# Patient Record
Sex: MALE | Race: OTHER | Employment: OTHER | URBAN - METROPOLITAN AREA
[De-identification: names, ages, dates, MRNs, and addresses within clinical notes are randomized per-mention and may not be internally consistent; named-entity substitution may affect disease eponyms.]

---

## 2017-05-25 ENCOUNTER — OFFICE VISIT (OUTPATIENT)
Dept: FAMILY MEDICINE CLINIC | Age: 47
End: 2017-05-25

## 2017-05-25 VITALS
HEIGHT: 65 IN | DIASTOLIC BLOOD PRESSURE: 89 MMHG | WEIGHT: 169 LBS | TEMPERATURE: 97.7 F | SYSTOLIC BLOOD PRESSURE: 130 MMHG | BODY MASS INDEX: 28.16 KG/M2 | HEART RATE: 65 BPM

## 2017-05-25 DIAGNOSIS — S39.012A LUMBAR STRAIN, INITIAL ENCOUNTER: Primary | ICD-10-CM

## 2017-05-25 DIAGNOSIS — R09.81 NASAL CONGESTION: ICD-10-CM

## 2017-05-25 DIAGNOSIS — R42 VERTIGO: ICD-10-CM

## 2017-05-25 RX ORDER — LORATADINE 10 MG/1
10 TABLET ORAL DAILY
Qty: 30 TAB | Refills: 2 | Status: SHIPPED | OUTPATIENT
Start: 2017-05-25 | End: 2022-01-24 | Stop reason: ALTCHOICE

## 2017-05-25 RX ORDER — TRAMADOL HYDROCHLORIDE 50 MG/1
50 TABLET ORAL
Qty: 15 TAB | Refills: 0 | Status: SHIPPED | OUTPATIENT
Start: 2017-05-25 | End: 2018-03-25

## 2017-05-25 RX ORDER — METHOCARBAMOL 500 MG/1
500 TABLET, FILM COATED ORAL 3 TIMES DAILY
Qty: 12 TAB | Refills: 0 | Status: SHIPPED | OUTPATIENT
Start: 2017-05-25 | End: 2018-03-25

## 2017-05-25 RX ORDER — NAPROXEN 500 MG/1
500 TABLET ORAL 2 TIMES DAILY WITH MEALS
Qty: 60 TAB | Refills: 0 | Status: SHIPPED | OUTPATIENT
Start: 2017-05-25 | End: 2018-03-25

## 2017-05-25 NOTE — PATIENT INSTRUCTIONS
Mareos: Instrucciones de cuidado - [ Dizziness: Care Instructions ]  Instrucciones de cuidado  Los mareos son Kenyatta Craft sensación de inestabilidad o confusión en la tushar. Son distintos al vértigo, airam sensación de que la habitación gira o de que usted se mueve o . También es distinto del aturdimiento, que es la sensación de que está a punto de desmayarse. Puede resultar difícil conocer la causa de los [place]. Algunas personas se sienten mareadas cuando tienen migrañas. A veces, los episodios gripales pueden hacer que se sienta mareado. Algunas afecciones médicas, sarah los problemas cardíacos o la presión arterial leticia, pueden hacer que se sienta mareado. Muchos medicamentos pueden causar mareos, sarah los que se usan para la presión arterial leticia, el dolor o la ansiedad. Si es un medicamento el que está causando los síntomas, ann médico podría recomendarle que lo cambie o deje de tomarlo. Si es un problema cardíaco, podría necesitar medicamentos para ayudar a que ann corazón funcione mejor. Si no hay razón aparente para los síntomas, ann médico podría sugerir vigilar y esperar jose un tiempo para james si los mareos desaparecen por sí solos. La atención de seguimiento es airam parte clave de ann tratamiento y seguridad. Asegúrese de hacer y acudir a todas las citas, y llame a ann médico si está teniendo problemas. También es airam buena idea saber los resultados de ezra exámenes y mantener airam lista de los medicamentos que rajiv. ¿Cómo puede cuidarse en el hogar? · Si ann médico le recomienda o receta medicamentos, tómelos exactamente según las indicaciones. Llame a ann médico si tutu estar teniendo un problema con ann medicamento. · No conduzca mientras se sienta mareado. · Trate de prevenir las caídas. Algunas medidas que puede franc son:  Lorrane Hidden Usar tapetes antideslizantes, agregar agarraderas cerca de la vargas y usar luces nocturnas.   ¨ Ordenar ann casa de kenna manera que en los senderos no haya nada con lo que se pueda tropezar. ¨ Avisarles a familiares y [facility] que se ha estado sintiendo [place] les servirá para saber cómo ayudarle. ¿Cuándo debe pedir ayuda? Llame al 911 en cualquier momento que considere que necesita atención de [place]. Por ejemplo, llame si:  · Se desmayó (perdió el conocimiento). · Tiene mareos junto con síntomas de un ataque cardíaco. Estos pueden incluir:  ¨ Dolor de pecho, o presión o airam sensación extraña en el pecho. ¨ Sudoración. ¨ Falta de aire. ¨ Náuseas o vómito. ¨ Dolor, presión, o airam sensación extraña en la espalda, el jorge, la mandíbula o el abdomen superior, o en gume o ambos hombros o brazos. ¨ Aturdimiento o debilidad repentina. ¨ Un latido cardíaco rápido o irregular. · Tiene síntomas de un ataque cerebral. Estos pueden incluir:  ¨ Entumecimiento, hormigueo, debilidad o parálisis repentinos en la kvng, el brazo o la pierna, sobre todo si ocurre en un solo lado del cuerpo. ¨ Cambios súbitos en la vista. ¨ Problemas repentinos para hablar. ¨ Confusión súbita o dificultad repentina para comprender frases sencillas. ¨ Problemas repentinos para caminar o mantener el equilibrio. ¨ Un dolor de tushar intenso y repentino, distinto a los wilman de tushar anteriores. Llame a ann médico ahora mismo o busque atención médica inmediata si:  · Se siente mareado y [place] [facility], dolor rosa m Balderrama o [facility] oídos. · Tiene nuevas náuseas y vómito o [address] · Mounika mareos no desaparecen o regresan. Preste especial atención a los cambios en ann amari y asegúrese de comunicarse con ann médico si:  · No mejora sarah se esperaba. ¿Dónde puede encontrar más información en inglés? Elisabeth Rainey a [email or web address] Donovan Morelos [id number] en la búsqueda para aprender más acerca de \"Mareos: Instrucciones de cuidado - [ Dizziness: Care Instructions ]. \"  Revisado: 27 [place], 2016  Versión del contenido: 11.2  © 7412-3949 Healthwise, Incorporated.  [address] fueron adaptadas bajo licencia por [employer] (which disclaims liability or warranty for this information). Si usted tiene [place] afección médica o sobre estas instrucciones, siempre pregunte a ann profesional de amari. [employer] niega toda garantía o responsabilidad por ann uso de esta información. Distensión de la espalda: Instrucciones de cuidado - [ Back Strain: Care Instructions ]  Instrucciones de cuidado    La distensión de la espalda ocurre cuando usted estira demasiado, o fuerza, un músculo de la espalda. Usted puede lesionarse la espalda en un accidente o cuando hace ejercicio o levanta algo. La mayoría de los wilman de espalda mejoran con reposo y [place]. Usted puede cuidarse en el hogar para ayudar a que ann espalda sane. La atención de seguimiento es airam parte clave de ann tratamiento y seguridad. Asegúrese de hacer y acudir a todas las citas, y llame a ann médico si está teniendo problemas. También es airam buena idea saber los resultados de los exámenes y mantener airam lista de los medicamentos que rajiv. ¿Cómo puede cuidarse en el hogar? · Trate de permanecer tan activo sarah pueda, renetta deje de hacer o reduzca cualquier actividad que le produzca dolor. · Colóquese hielo o airam compresa fría en el músculo adolorido de 10 a 20 minutos cada vez para detener la hinchazón. Trate de hacerlo cada 1 o 2 horas jose 3 días (cuando esté despierto) o hasta que la hinchazón baje. Póngase un paño pham entre el hielo y la piel. · Después de 2 o 3 días, coloque airam almohadilla térmica ajustada a baja temperatura o un paño tibio sobre la espalda. Algunos médicos sugieren que se alterne entre tratamientos calientes y fríos. · [employer] analgésicos (medicamentos para el dolor) exactamente según las indicaciones. ¨ Si el médico le recetó un analgésico, tómelo según las indicaciones.   ¨ Si no está tomando un analgésico recetado, pregúntele a ann médico si puede franc gume de [employer]. · Trate de dormir de lado con airam almohada entre las piernas. O, colóquese airam almohada debajo de las rodillas cuando se acueste [place] Estas medidas pueden aliviar el dolor en la parte baja de la espalda. · Meron Edman a poco a ann nivel habitual de actividades. ¿Cuándo debe pedir ayuda? Llame al 911 en cualquier momento que considere que necesita atención de [place]. Por ejemplo, llame si:  · Es completamente incapaz de  airam pierna. Llame a ann médico ahora mismo o busque atención médica inmediata si:  · Tiene síntomas nuevos o peores en las piernas, el abdomen o las nalgas (glúteos). Los síntomas pueden incluir:  ¨ Entumecimiento u hormigueo. ¨ Debilidad. ¨ Dolor. · Pierde el control de la vejiga o del intestino. Preste especial atención a los cambios en ann amari y asegúrese de comunicarse con ann médico si no mejora sarah se esperaba. ¿Dónde puede encontrar más información en inglés? Laila Cadenars a [email or web address] Yin Preston D903 en la búsqueda para aprender más acerca de \"Distensión de la espalda: Instrucciones de cuidado - [ Back Strain: Care Instructions ]. \"  Revisado: 23 [place], 2016  Versión del contenido: 11.2  © 6337-2805 Healthwise, Incorporated. Las instrucciones de cuidado fueron adaptadas bajo licencia por [employer] (which disclaims liability or warranty for this information). Si usted tiene [place] afección médica o sobre estas instrucciones, siempre pregunte a ann profesional de amari. [place], [facility] niega toda garantía o responsabilidad por ann uso de esta información. Estiramientos de la espalda: Ejercicios - [ Back Stretches: Exercises ]  Instrucciones de cuidado  Aquí se presentan algunos ejemplos de ejercicios para estiramiento de la espalda. Empiece cada ejercicio lentamente. Reduzca la intensidad del ejercicio si Gerardine Campanile a tener dolor.   Ann médico o fisioterapeuta le dirán cuándo puede comenzar con [place] ejercicios y cuáles funcionarán mejor para usted. Cómo hacer los ejercicios   Estiramiento sobre la tushar    1. Párese cómodamente y separe los pies a la distancia de los hombros.  2. T[place] adelante, levante ambos brazos sobre ann Lea Lesches y trate de alcanzar el techo. No deje que la tushar se incline [place] atrás. 3. Mantenga la posición jose 15 a 30 segundos y [employer] a los lados. 4. Repita de 2 a 4 veces. Estiramiento lateral    1. Párese cómodamente y separe los pies a la distancia de los hombros.  2. Jerilynn Mates un brazo sobre la tushar y luego inclínese hacia el otro lado. 3. Deslice ann mano por la pierna mientras tiffani que el peso de ann brazo estire suavemente los músculos laterales. Mantenga la posición entre 15 y 27 segundos. 4. Repita de 2 a 4 veces de cada lado. [place]    1. Acuéstese boca abajo, apoyando ann cuerpo con los antebrazos. 2. Beau presión con los codos en el piso para elevar la espalda. Al hacer esto, relaje los músculos del estómago y permita que ann espalda se arquee sin usar los músculos de ann espalda. Al hacer presión, evite que ezra caderas o la pelvis se separen del piso. 3. Mantenga la posición jose 15 a 30 segundos y luego relájese. 4. Repita de 2 a 4 veces. Relajamiento y descanso    1. Acuéstese boca arriba con airam toalla enrollada debajo de ann jorge y [place] almohada debajo de las rodillas. Extienda los brazos cómodamente a los lados. 2. Relájese y respire con normalidad. 3. Permanezca en esta posición jose unos 10 minutos. 4. Si quiere, puede hacer [employer] 2 y 3 veces al día. La atención de seguimiento es airam parte clave de ann tratamiento y seguridad. Asegúrese de hacer y acudir a todas las citas, y llame a ann médico si está teniendo problemas. También es airam buena idea saber los resultados de los exámenes y mantener airam lista de los medicamentos que rajiv. ¿Dónde puede encontrar más información en inglés?   Lilia Moulds a [email or web address] Gregg Elder B073 en la búsqueda para aprender más acerca de \"Estiramientos de la espalda: Ejercicios - [ Back Stretches: Exercises ]. \"  Revisado: 23 [place], 2016  Versión del contenido: 11.2  © 7105-5198 Healthwise, Incorporated. Las instrucciones de cuidado fueron adaptadas bajo licencia por [employer] (which disclaims liability or warranty for this information). Si usted tiene [place] afección médica o sobre estas instrucciones, siempre pregunte a ann profesional de amari. [facility], [employer] niega toda garantía o responsabilidad por ann uso de esta información.

## 2017-05-25 NOTE — PROGRESS NOTES
Assessment/Plan:    Brenden was seen today for back pain and dizziness. Diagnoses and all orders for this visit:    Lumbar strain, initial encounter  -     methocarbamol (ROBAXIN) 500 mg tablet; Take 1 Tab by mouth three (3) times daily. [place] 1 pastilla 3 veces al linda por ann dolor  -     naproxen (NAPROSYN) 500 mg tablet; Take 1 Tab by mouth two (2) times daily (with meals). [place] 1 [employer] veces al linda con comida por ann dolor  -     traMADol (ULTRAM) 50 mg tablet; Take 1 Tab by mouth every six (6) hours as needed for Pain. Max Daily Amount: 200 mg. [place] 1 pastilla cada 6 horas por so dolor thanh    Nasal congestion  -     loratadine (CLARITIN) 10 mg tablet; Take 1 Tab by mouth daily. [place] 1 pastilla cada linda por ann mareos y ann nares    Vertigo  -     loratadine (CLARITIN) 10 mg tablet; Take 1 Tab by mouth daily. [place] 1 pastilla cada linda por ann mareos y ann nares        Follow-up Disposition:  Return if symptoms worsen or fail to improve. Douglas Ortiz PA-C  Brenden [facility] expressed understanding of this plan. An AVS was printed and given to the patient.      ----------------------------------------------------------------------    Chief Complaint   Patient presents with    Back Pain     in the lower back    Dizziness       History of Present Illness:  2 days of lumbar back pain. No known injury but his job is to move concrete blocks around. He has no radiation of the pain and he has not had any numbness to his extremities, no bowel or bladder changes either. Yesterday, he had a few episodes of feeling lightheaded. He also has had nasal congestion. No cough        No past medical history on file. Current Outpatient Prescriptions   Medication Sig Dispense Refill    methocarbamol (ROBAXIN) 500 mg tablet Take 1 Tab by mouth three (3) times daily.  [place] 1 pastilla 3 veces al linda por ann dolor 12 Tab 0    naproxen (NAPROSYN) 500 mg tablet Take 1 Tab by mouth two (2) times daily (with meals). [place] 1 [facility] [employer] veces al linda con comida por ann dolor 60 Tab 0    traMADol (ULTRAM) 50 mg tablet Take 1 Tab by mouth every six (6) hours as needed for Pain. Max Daily Amount: 200 mg. [place] 1 pastilla cada 6 horas por so dolor thanh 15 Tab 0    loratadine (CLARITIN) 10 mg tablet Take 1 Tab by mouth daily. [place] 1 pastilla cada linda por ann mareos y ann nares 30 Tab 2       No Known Allergies    Social History   Substance Use Topics    Smoking status: Former Smoker     Quit date: 4/26/2016    Smokeless tobacco: Never Used    Alcohol use No      Comment: quit 15 days ago       No family history on file.     Physical Exam:     Visit Vitals    /89 (BP 1 Location: Right arm, BP Patient Position: Sitting)    Pulse 65    Temp 97.7 °F (36.5 °C) (Oral)    Ht 5' 4.69\" (1.643 m)    Wt 169 lb (76.7 kg)    BMI 28.4 kg/m2     gen looks well, walking with rigid gait  A&Ox3  WDWN NAD  Respirations normal and non labored  Toe touch limited due to pain  Right SLR reproduces pain at 45deg  Toe walk and heel walk reproduce pain  At lumbar spine- he has spasms of his paraspinal m  HEENT- neg for acute findings  Neuro CN 2-12 grossly intact

## 2017-05-25 NOTE — PROGRESS NOTES
Avs discussed with Thony Ortiz by Discharge Nurse Gerald Hernandez LPN, with  Sondra Lozano. Discussed medications prescribed today. Paper rx for Tramadol given to pt. Went over exercises for the back. Pt advised to return of he is not getting any better. Pt verbalized understanding and had no further questions.  AVS printed and given to patient Gerald Hernandez LPN

## 2017-05-25 NOTE — PROGRESS NOTES
Coordination of Care  1. Have you been to the ER, urgent care clinic since your last visit? Hospitalized since your last visit? No    2. Have you seen or consulted any other health care providers outside of the [address] [place] [address] since your last visit? Include any pap smears or colon screening. No    Medications  Medication Reconciliation Performed: no  Patient does not know need refills     Learning Assessment Complete?  yes

## 2018-03-25 ENCOUNTER — HOSPITAL ENCOUNTER (EMERGENCY)
Age: 48
Discharge: HOME | End: 2018-03-25
Attending: EMERGENCY MEDICINE
Payer: SUBSIDIZED

## 2018-03-25 ENCOUNTER — APPOINTMENT (OUTPATIENT)
Dept: CT IMAGING | Age: 48
End: 2018-03-25
Attending: EMERGENCY MEDICINE
Payer: SUBSIDIZED

## 2018-03-25 ENCOUNTER — APPOINTMENT (OUTPATIENT)
Dept: GENERAL RADIOLOGY | Age: 48
End: 2018-03-25
Attending: EMERGENCY MEDICINE
Payer: SUBSIDIZED

## 2018-03-25 VITALS
HEIGHT: 64 IN | OXYGEN SATURATION: 98 % | RESPIRATION RATE: 19 BRPM | WEIGHT: 169 LBS | TEMPERATURE: 97.6 F | HEART RATE: 84 BPM | SYSTOLIC BLOOD PRESSURE: 127 MMHG | DIASTOLIC BLOOD PRESSURE: 78 MMHG | BODY MASS INDEX: 28.85 KG/M2

## 2018-03-25 DIAGNOSIS — A08.4 VIRAL GASTROENTERITIS: Primary | ICD-10-CM

## 2018-03-25 DIAGNOSIS — R10.12 ABDOMINAL PAIN, LUQ (LEFT UPPER QUADRANT): ICD-10-CM

## 2018-03-25 LAB
ALBUMIN SERPL-MCNC: 3.7 G/DL (ref 3.5–5)
ALBUMIN/GLOB SERPL: 0.8 {RATIO} (ref 1.1–2.2)
ALP SERPL-CCNC: 118 U/L (ref 45–117)
ALT SERPL-CCNC: 71 U/L (ref 12–78)
ANION GAP SERPL CALC-SCNC: 11 MMOL/L (ref 5–15)
AST SERPL-CCNC: 96 U/L (ref 15–37)
BASOPHILS # BLD: 0 K/UL (ref 0–0.1)
BASOPHILS NFR BLD: 0 % (ref 0–1)
BILIRUB SERPL-MCNC: 0.4 MG/DL (ref 0.2–1)
BUN SERPL-MCNC: 21 MG/DL (ref 6–20)
BUN/CREAT SERPL: 25 (ref 12–20)
CALCIUM SERPL-MCNC: 8.3 MG/DL (ref 8.5–10.1)
CHLORIDE SERPL-SCNC: 104 MMOL/L (ref 97–108)
CO2 SERPL-SCNC: 24 MMOL/L (ref 21–32)
CREAT SERPL-MCNC: 0.85 MG/DL (ref 0.7–1.3)
DIFFERENTIAL METHOD BLD: ABNORMAL
EOSINOPHIL # BLD: 0.1 K/UL (ref 0–0.4)
EOSINOPHIL NFR BLD: 2 % (ref 0–7)
ERYTHROCYTE [DISTWIDTH] IN BLOOD BY AUTOMATED COUNT: 12.7 % (ref 11.5–14.5)
GLOBULIN SER CALC-MCNC: 4.8 G/DL (ref 2–4)
GLUCOSE SERPL-MCNC: 100 MG/DL (ref 65–100)
HCT VFR BLD AUTO: 41.9 % (ref 36.6–50.3)
HGB BLD-MCNC: 13.9 G/DL (ref 12.1–17)
IMM GRANULOCYTES # BLD: 0 K/UL (ref 0–0.04)
IMM GRANULOCYTES NFR BLD AUTO: 0 % (ref 0–0.5)
LYMPHOCYTES # BLD: 1.4 K/UL (ref 0.8–3.5)
LYMPHOCYTES NFR BLD: 36 % (ref 12–49)
MCH RBC QN AUTO: 29.8 PG (ref 26–34)
MCHC RBC AUTO-ENTMCNC: 33.2 G/DL (ref 30–36.5)
MCV RBC AUTO: 89.7 FL (ref 80–99)
MONOCYTES # BLD: 0.5 K/UL (ref 0–1)
MONOCYTES NFR BLD: 13 % (ref 5–13)
NEUTS SEG # BLD: 2 K/UL (ref 1.8–8)
NEUTS SEG NFR BLD: 49 % (ref 32–75)
NRBC # BLD: 0 K/UL (ref 0–0.01)
NRBC BLD-RTO: 0 PER 100 WBC
PLATELET # BLD AUTO: 287 K/UL (ref 150–400)
PMV BLD AUTO: 9.4 FL (ref 8.9–12.9)
POTASSIUM SERPL-SCNC: 4.4 MMOL/L (ref 3.5–5.1)
PROT SERPL-MCNC: 8.5 G/DL (ref 6.4–8.2)
RBC # BLD AUTO: 4.67 M/UL (ref 4.1–5.7)
SODIUM SERPL-SCNC: 139 MMOL/L (ref 136–145)
WBC # BLD AUTO: 4 K/UL (ref 4.1–11.1)

## 2018-03-25 PROCEDURE — 99284 EMERGENCY DEPT VISIT MOD MDM: CPT

## 2018-03-25 PROCEDURE — 87045 FECES CULTURE AEROBIC BACT: CPT | Performed by: EMERGENCY MEDICINE

## 2018-03-25 PROCEDURE — 74011250636 HC RX REV CODE- 250/636: Performed by: EMERGENCY MEDICINE

## 2018-03-25 PROCEDURE — 71046 X-RAY EXAM CHEST 2 VIEWS: CPT

## 2018-03-25 PROCEDURE — 87077 CULTURE AEROBIC IDENTIFY: CPT | Performed by: EMERGENCY MEDICINE

## 2018-03-25 PROCEDURE — 80053 COMPREHEN METABOLIC PANEL: CPT | Performed by: EMERGENCY MEDICINE

## 2018-03-25 PROCEDURE — 74011636320 HC RX REV CODE- 636/320: Performed by: RADIOLOGY

## 2018-03-25 PROCEDURE — 36415 COLL VENOUS BLD VENIPUNCTURE: CPT | Performed by: EMERGENCY MEDICINE

## 2018-03-25 PROCEDURE — 74177 CT ABD & PELVIS W/CONTRAST: CPT

## 2018-03-25 PROCEDURE — 96360 HYDRATION IV INFUSION INIT: CPT

## 2018-03-25 PROCEDURE — 74011250637 HC RX REV CODE- 250/637: Performed by: EMERGENCY MEDICINE

## 2018-03-25 PROCEDURE — 96372 THER/PROPH/DIAG INJ SC/IM: CPT

## 2018-03-25 PROCEDURE — 87177 OVA AND PARASITES SMEARS: CPT | Performed by: EMERGENCY MEDICINE

## 2018-03-25 PROCEDURE — 85025 COMPLETE CBC W/AUTO DIFF WBC: CPT | Performed by: EMERGENCY MEDICINE

## 2018-03-25 RX ORDER — DIPHENOXYLATE HYDROCHLORIDE AND ATROPINE SULFATE 2.5; .025 MG/1; MG/1
2 TABLET ORAL
Status: COMPLETED | OUTPATIENT
Start: 2018-03-25 | End: 2018-03-25

## 2018-03-25 RX ORDER — DICYCLOMINE HYDROCHLORIDE 20 MG/1
20 TABLET ORAL EVERY 6 HOURS
Qty: 20 TAB | Refills: 0 | Status: SHIPPED | OUTPATIENT
Start: 2018-03-25 | End: 2018-03-30

## 2018-03-25 RX ORDER — DICYCLOMINE HYDROCHLORIDE 10 MG/ML
20 INJECTION INTRAMUSCULAR
Status: COMPLETED | OUTPATIENT
Start: 2018-03-25 | End: 2018-03-25

## 2018-03-25 RX ADMIN — SODIUM CHLORIDE 1000 ML: 900 INJECTION, SOLUTION INTRAVENOUS at 19:32

## 2018-03-25 RX ADMIN — DIPHENOXYLATE HYDROCHLORIDE AND ATROPINE SULFATE 2 TABLET: 2.5; .025 TABLET ORAL at 19:33

## 2018-03-25 RX ADMIN — IOPAMIDOL 100 ML: 755 INJECTION, SOLUTION INTRAVENOUS at 20:39

## 2018-03-25 RX ADMIN — DICYCLOMINE HYDROCHLORIDE 20 MG: 10 INJECTION INTRAMUSCULAR at 19:32

## 2018-03-25 NOTE — ED NOTES
Bedside shift change report given to Elvira Huang (oncoming nurse) by Fran Osler (offgoing nurse). Report included the following information SBAR, ED Summary and MAR.

## 2018-03-25 NOTE — LETTER
[address] 
[facility] EMERGENCY DEPT 
[address] [facility] [id number] 
[phone] Work/School Note Date: 3/25/2018 To Whom It May concern: 
 
Brenden Dominguez was seen and treated today in the emergency room by the following provider(s): 
Attending Provider: Ben Leone MD. Brenden Dominguez may return to work on 3/28/2018.  
 
Sincerely, 
 
 
 
 
Ben Leone MD

## 2018-03-26 NOTE — ED NOTES
I have reviewed discharge instructions with the patient. The patient verbalized understanding. Pt confirmed understanding of need for follow up with primary care provider. Pt is not in any current distress and shows no evidence of clinical instability. Pt left ambulatory  Pt family/friends are present. Pt left with all personal belongings. Pt states they are not driving. Pt states chief complaint has  improved with treatment provided    PT is alert and oriented x 4, Respiratory status is WNL, Cardiac system is WNL    Paperwork given by provider and reviewed with patient, opportunity for questions/clarification given.     Patient Vitals for the past 4 hrs:   Temp Pulse Resp BP SpO2   03/25/18 2115 - - - 127/78 98 %   03/25/18 2030 - - - 129/68 97 %   03/25/18 2015 - - - 131/82 100 %   03/25/18 2000 - - - 136/89 100 %   03/25/18 1945 - - - 124/89 100 %   03/25/18 1930 - - - (!) 143/108 97 %   03/25/18 1914 - - - - 98 %   03/25/18 1831 97.6 °F (36.4 °C) 84 19 131/81 97 %

## 2018-03-26 NOTE — ED PROVIDER NOTES
Patient is a 52 y.o. male presenting with diarrhea and abdominal pain. The history is provided by the patient. The history is limited by a language barrier. A  was used (renata). Diarrhea    This is a new problem. Episode onset: 3-4 days ago. The problem occurs constantly. The problem has not changed since onset. The pain is located in the LUQ and LLQ. The quality of the pain is cramping and aching. The pain is at a severity of 4/10. Associated symptoms include diarrhea. Pertinent negatives include no fever, no melena, no nausea, no vomiting, no dysuria, no frequency, no headaches, no chest pain and no back pain. Past workup includes colonoscopy. The patient's surgical history non-contributory. Abdominal Pain    This is a new problem. The current episode started more than 2 days ago. The problem occurs constantly. The problem has been gradually worsening. The pain is located in the LLQ and LUQ. The pain is at a severity of 4/10. Associated symptoms include diarrhea. Pertinent negatives include no fever, no melena, no nausea, no vomiting, no dysuria, no frequency, no headaches, no chest pain and no back pain. Past workup includes colonoscopy. The patient's surgical history non-contributory. No past medical history on file. Past Surgical History:   Procedure Laterality Date    COLONOSCOPY  9/5/2014              No family history on file. Social History     Social History    Marital status: SINGLE     Spouse name: N/A    Number of children: N/A    Years of education: N/A     Occupational History    Not on file. Social History Main Topics    Smoking status: Former Smoker     Quit date: 4/26/2016    Smokeless tobacco: Never Used    Alcohol use No      Comment: quit 15 days ago    Drug use: No    Sexual activity: Yes     Other Topics Concern    Not on file     Social History Narrative         ALLERGIES: Review of patient's allergies indicates no known allergies.     Review of Systems   Constitutional: Positive for fatigue. Negative for chills and fever. Respiratory: Positive for cough. Negative for chest tightness and shortness of breath. Cardiovascular: Negative for chest pain and palpitations. Gastrointestinal: Positive for abdominal pain and diarrhea. Negative for melena, nausea and vomiting. Genitourinary: Negative for dysuria and frequency. Musculoskeletal: Negative for back pain and neck pain. Neurological: Negative for dizziness and headaches. Vitals:    03/25/18 1831 03/25/18 1914   BP: 131/81    Pulse: 84    Resp: 19    Temp: 97.6 °F (36.4 °C)    SpO2: 97% 98%   Weight: 76.7 kg (169 lb)    Height: 5' 4\" (1.626 m)             Physical Exam   Constitutional: He is oriented to person, place, and time. He appears well-developed and well-nourished. HENT:   Head: Normocephalic and atraumatic. Cardiovascular: Normal rate, regular rhythm and intact distal pulses. No murmur heard. Pulmonary/Chest: Effort normal and breath sounds normal. No respiratory distress. He has no wheezes. Abdominal: Soft. Bowel sounds are normal. There is tenderness in the left upper quadrant and left lower quadrant. There is no rebound. Musculoskeletal: Normal range of motion. He exhibits no edema, tenderness or deformity. Neurological: He is alert and oriented to person, place, and time. Coordination normal.   Skin: He is not diaphoretic. Psychiatric: He has a normal mood and affect. Nursing note and vitals reviewed. MDM  Number of Diagnoses or Management Options  Abdominal pain, LUQ (left upper quadrant):   Viral gastroenteritis:   Diagnosis management comments: Patient with diarrhea and left sided abdominal pain - possible viral gi illness vs diverticular disease vs parasitic infection causing diarrhea - check labs and get CXR for atypical pneumonia with slight cough. Will likely need CT to r/o diverticular disease.       2115 - no acute EMC noted, after review of CT, WBC - suspect viral gastroenteritis, will d/c with rx for bentyl and reasons to return to the ED. Patient advised to get a primary care doctor with [facility]       Amount and/or Complexity of Data Reviewed  Clinical lab tests: ordered and reviewed  Tests in the radiology section of CPT®: ordered and reviewed  Independent visualization of images, tracings, or specimens: yes          ED Course       Procedures         VITALS:   Patient Vitals for the past 8 hrs:   Temp Pulse Resp BP SpO2   03/25/18 1914 - - - - 98 %   03/25/18 1831 97.6 °F (36.4 °C) 84 19 131/81 97 %                  Recent Results (from the past 24 hour(s))   CBC WITH AUTOMATED DIFF    Collection Time: 03/25/18  7:13 PM   Result Value Ref Range    WBC 4.0 (L) 4.1 - 11.1 K/uL    RBC 4.67 4.10 - 5.70 M/uL    HGB 13.9 12.1 - 17.0 g/dL    HCT 41.9 36.6 - 50.3 %    MCV 89.7 80.0 - 99.0 FL    MCH 29.8 26.0 - 34.0 PG    MCHC 33.2 30.0 - 36.5 g/dL    RDW 12.7 11.5 - 14.5 %    PLATELET 253 783 - 816 K/uL    MPV 9.4 8.9 - 12.9 FL    NRBC 0.0 0  WBC    ABSOLUTE NRBC 0.00 0.00 - 0.01 K/uL    NEUTROPHILS 49 32 - 75 %    LYMPHOCYTES 36 12 - 49 %    MONOCYTES 13 5 - 13 %    EOSINOPHILS 2 0 - 7 %    BASOPHILS 0 0 - 1 %    IMMATURE GRANULOCYTES 0 0.0 - 0.5 %    ABS. NEUTROPHILS 2.0 1.8 - 8.0 K/UL    ABS. LYMPHOCYTES 1.4 0.8 - 3.5 K/UL    ABS. MONOCYTES 0.5 0.0 - 1.0 K/UL    ABS. EOSINOPHILS 0.1 0.0 - 0.4 K/UL    ABS. BASOPHILS 0.0 0.0 - 0.1 K/UL    ABS. IMM.  GRANS. 0.0 0.00 - 0.04 K/UL    DF AUTOMATED     METABOLIC PANEL, COMPREHENSIVE    Collection Time: 03/25/18  7:13 PM   Result Value Ref Range    Sodium 139 136 - 145 mmol/L    Potassium 4.4 3.5 - 5.1 mmol/L    Chloride 104 97 - 108 mmol/L    CO2 24 21 - 32 mmol/L    Anion gap 11 5 - 15 mmol/L    Glucose 100 65 - 100 mg/dL    BUN 21 (H) 6 - 20 MG/DL    Creatinine 0.85 0.70 - 1.30 MG/DL    BUN/Creatinine ratio 25 (H) 12 - 20      GFR est AA >60 >60 ml/min/1.73m2    GFR est non-AA >60 >60 ml/min/1.73m2    Calcium 8.3 (L) 8.5 - 10.1 MG/DL    Bilirubin, total 0.4 0.2 - 1.0 MG/DL    ALT (SGPT) 71 12 - 78 U/L    AST (SGOT) 96 (H) 15 - 37 U/L    Alk. phosphatase 118 (H) 45 - 117 U/L    Protein, total 8.5 (H) 6.4 - 8.2 g/dL    Albumin 3.7 3.5 - 5.0 g/dL    Globulin 4.8 (H) 2.0 - 4.0 g/dL    A-G Ratio 0.8 (L) 1.1 - 2.2         Xr Chest Pa Lat    Result Date: 3/25/2018  INDICATION:   chest pain - possible LLL pneumonia EXAM:  PA and Lateral Chest Radiographs COMPARISON: None FINDINGS: PA and lateral views of the chest demonstrate a normal cardiomediastinal silhouette. The lungs are adequately expanded. There is no edema, effusion, consolidation, or pneumothorax. The osseous structures are unremarkable. IMPRESSION: No acute process. Ct Abd Pelv W Cont    Result Date: 3/25/2018  INDICATION: Left sided abdominal pain - possible diverticulitis COMPARISON: None TECHNIQUE: Following the uneventful intravenous administration of 100 cc Isovue-370, thin axial images were obtained through the abdomen and pelvis. Coronal and sagittal reconstructions were generated. Oral contrast was not administered. CT dose reduction was achieved through use of a standardized protocol tailored for this examination and automatic exposure control for dose modulation. FINDINGS: LUNG BASES: No abnormality. LIVER: No mass or biliary dilatation. GALLBLADDER: Contracted. SPLEEN: No enlargement or lesion. PANCREAS: No mass or ductal dilatation. ADRENALS: No mass. KIDNEYS: No mass, calculus, or hydronephrosis. GI TRACT:  No evidence of bowel obstruction. Stomach is distended with fluid and food material. There is circumferential wall thickening of multiple small bowel loops in the left midabdomen, which could be accentuated by lack of oral contrast material. Moderate amount of fluid throughout the colon PERITONEUM: No free air or free fluid. APPENDIX: Unremarkable. RETROPERITONEUM: No lymphadenopathy or aortic aneurysm.  ADDITIONAL COMMENTS: Few scattered nonenlarged mesenteric lymph nodes in the central abdomen. URINARY BLADDER: Unremarkable. REPRODUCTIVE ORGANS: Unremarkable. LYMPH NODES:  None enlarged. FREE FLUID:  None. BONES: No destructive bone lesion. ADDITIONAL COMMENTS: N/A. IMPRESSION: Extensive small bowel wall thickening in the left midabdomen, which is nonspecific but could be due to an infectious or inflammatory process. No bowel obstruction, free fluid or free air. Few nonenlarged mesenteric lymph nodes. Normal appendix.

## 2018-03-26 NOTE — DISCHARGE INSTRUCTIONS
Dolor abdominal: Instrucciones de cuidado - [ Abdominal Pain: Care Instructions ]  Instrucciones de cuidado    El dolor abdominal tiene muchas causas posibles. Algunas de ellas no son graves y mejoran por sí solas en unos días. Otras requieren [place] y [place]. Si ann dolor continúa o [place], necesitará airam nueva revisión y [place] pruebas para determinar qué pasa. Es posible que necesite cirugía para corregir el problema. No ignore nuevos síntomas, sarah fiebre, náuseas y [place], [facility], dolor que [place] o [place]. Podrían ser señales de un problema más grave. Ann médico puede haberle recomendado airam consulta de Michele & Traci las 8 o 12 horas siguientes. Si no se siente mejor, es posible que requiera [place] o [place]. El médico lo dye revisado minuciosamente, renetta puede teodoro problemas más tarde. Si nota algún problema o síntomas nuevos, busque tratamiento médico inmediatamente. La atención de seguimiento es airam parte clave de ann tratamiento y seguridad. Asegúrese de hacer y acudir a todas las citas, y llame a ann médico si está teniendo problemas. También es airam buena idea saber los resultados de los exámenes y mantener airam lista de los medicamentos que rajiv. ¿Cómo puede cuidarse en el hogar? · Descanse hasta que se sienta mejor. · Para prevenir la deshidratación, juliette abundantes líquidos, suficientes para que ann orina sea de color amarillo david o transparente sarah el agua. Elija beber agua y otros líquidos cesar sin cafeína hasta que se sienta mejor. Si tiene [employer], del corazón o del hígado y tiene que Hermelinda's líquidos, hable con ann médico antes de aumentar ann consumo. · Si tiene [employer], coma alimentos suaves, sarah arroz, pan dawson seco o galletas saladas, bananas (plátanos) y puré de Synchari. Trate de comer varias comidas pequeñas al día en lugar de dos o wesley grandes.   · Espere hasta 50 horas después de que [employer] síntomas hayan desaparecido antes de comer alimentos condimentados, alcohol y bebidas que contengan cafeína. · No consuma alimentos ricos en grasa. · Evite medicamentos antiinflamatorios sarah aspirina, ibuprofeno (Advil, Motrin) y naproxeno (Aleve). Pueden causar [employer]. Dígale a witt médico si está tomando aspirina diariamente debido a otro problema de amari. ¿Cuándo debe pedir ayuda? Llame al 911 en cualquier momento que considere que necesita atención de emergencia. Por ejemplo, llame si:  ? · Se desmayó (perdió el conocimiento). ? · Las heces son de color rojizo o muy sanguinolentas (con eliazar). ? · Vomita eliazar o algo parecido a granos de café molido. ? · Tiene dolor abdominal nuevo e intenso. ? Llame a witt médico ahora mismo o busque atención médica inmediata si:  ? · Witt dolor empeora, sobre todo si se concentra en airam shayla parte del vientre. ? · Vuelve a tener fiebre o tiene fiebre más leticia. ? · Mounika heces son negruzcas y parecidas al alquitrán o tienen rastros de [place]. ? · Tiene sangrado vaginal inesperado. ? · Tiene síntomas de airam infección del tracto urinario. Estos podrían incluir:  ¨ Dolor al [place]. ¨ Orinar con más frecuencia que lo habitual.  ¨ Eliazar en la [facility]. ? · [employer] o aturdimiento, o que está a punto de [place]. ?Preste especial atención a los cambios en witt amari y asegúrese de comunicarse con witt médico si:  ? · No está mejorando después de 1 día (24 horas). ¿Dónde puede encontrar más información en inglés? Breanna Gutierrez a [email or web address] Agnes [place] [id number] en la búsqueda para aprender más acerca de \"Dolor abdominal: Instrucciones de cuidado - [ Abdominal Pain: Care Instructions ]. \"  Revisado: 20 Konrad Rand 2017  Versión del contenido: 11.4  © 7022-5905 Healthwise, Incorporated. Las instrucciones de cuidado fueron adaptadas bajo licencia por [employer] (which disclaims liability or warranty for this information).  Si usted tiene [place] afección médica o sobre estas instrucciones, siempre pregunte a ann profesional de amari. [facility] niega toda garantía o responsabilidad por ann uso de esta información. Gastroenteritis: Instrucciones de cuidado - [ Gastroenteritis: Care Instructions ]  Instrucciones de cuidado    La gastroenteritis es airam enfermedad que puede causar náuseas, vómito y [place]. A veces se la llama \"gastroenteritis viral\". Puede ser causada por airam bacteria o un virus. Es probable que comience a sentirse mejor en 1 a 2 días. Entretanto, descanse mucho y asegúrese de no deshidratarse. La deshidratación ocurre cuando ann cuerpo pierde demasiado líquido. La atención de seguimiento es airam parte clave de ann tratamiento y seguridad. Asegúrese de hacer y acudir a todas las citas, y llame a ann médico si está teniendo problemas. También es airam buena idea saber los resultados de los exámenes y mantener airam lista de los medicamentos que rajiv. ¿Cómo puede cuidarse en el hogar? · Si ann médico le recetó antibióticos, tómelos según las indicaciones. No deje de tomarlos por el hecho de sentirse mejor. Debe franc todos los antibióticos hasta terminarlos. · Josi abundantes líquidos para prevenir la deshidratación, lo suficiente para que ann orina sea de color amarillo david o transparente sarah el agua. Elija beber agua y otros líquidos cesar sin cafeína hasta que se sienta mejor. Si tiene airam enfermedad del riñón, del corazón o del hígado y tiene que Hermelinda's líquidos, hable con ann médico antes de aumentar ann consumo. · Josi los líquidos lentamente, en cantidades pequeñas y frecuentes, ya que beber [employer] muy rápido le puede causar vómito. · Empiece a comer alimentos suaves, sarah pan dawson seco, yogur, arroz, bananas (plátanos) y puré de Synchari. Evite los alimentos condimentados, picantes o con gran contenido de [place] y no josi alcohol ni cafeína jose gume o [address].  No josi [place] ni coma helado hasta que se sienta mejor. Cómo prevenir la gastroenteritis  · [address] comidas calientes, calientes y las frías, frías. · No consuma justen, aderezos, ensaladas u otros alimentos que [employer] a temperatura ambiente jose más de 2 horas. · Utilice un termómetro para verificar la temperatura de ann refrigerador (nevera). La temperatura debería estar entre 34°F y 40°F (1°C y 4°C). · Descongele la carne en el refrigerador o el microondas, no sobre la encimera. · [address] y la cocina limpias. Lávese las [place], las tablas de picar y las encimeras con frecuencia, con agua jabonosa caliente. · Cocine la carne hasta que esté jeancarlos cocinada. · No coma huevos crudos ni salsas no cocidas hechas con huevos crudos. · No corra riesgos. Si la comida sabe o parece echada a perder, deséchela. ¿Cuándo debe pedir ayuda? Llame al 911 en cualquier momento que considere que necesita atención de emergencia. Por ejemplo, llame si:  ? · Vomita eliazar o algo parecido a granos de café molido. ? · Se desmayó (perdió el conocimiento). ? · Las heces son de color rojizo o muy sanguinolentas (con eliazar). ?Llame a ann médico ahora mismo o busque atención médica inmediata si:  ? · Tiene dolor intenso en el vientre. ? · Tiene señales de necesitar más líquidos. Tiene los ojos hundidos, la boca seca y poca orina de color oscuro. ? · Siente sarah si fuera a desmayarse. ? · Tiene mayor dolor abdominal que no desaparece en 1 a 2 días. ? · Tiene nuevas náuseas o éstas aumentan, o tiene vómito. ? · Tiene fiebre nueva o más leticia. ? · Mounika heces son negruzcas y parecidas al alquitrán o tienen rastros de [place]. ?Preste especial atención a los cambios en ann amari y asegúrese de comunicarse con ann médico si:  ? · Se siente mareado o aturdido. ? · Orina menos de lo habitual o ann orina es de color amarillento oscuro o amarronado. ? · No se siente mejor con cada día que pasa.    ¿Dónde puede encontrar más información en inglés? Filippo Quiver a [email or web address] [address] en la búsqueda para aprender más acerca de \"Gastroenteritis: Instrucciones de cuidado - [ Gastroenteritis: Care Instructions ]. \"  Revisado: 3 Anika Lawson 2017  Versión del contenido: 11.4  © 8929-8413 Healthwise, Incorporated. Las instrucciones de cuidado fueron adaptadas bajo licencia por [employer] (which disclaims liability or warranty for this information). Si usted tiene [place] afección médica o sobre estas instrucciones, siempre pregunte a ann profesional de amari. Healthwise, Incorporated niega toda garantía o responsabilidad por ann uso de esta información.

## 2018-03-27 LAB
BACTERIA SPEC CULT: NORMAL
C JEJUNI+C COLI AG STL QL: NEGATIVE
E COLI SXT1+2 STL IA: NEGATIVE
SERVICE CMNT-IMP: NORMAL

## 2018-03-29 LAB
O+P SPEC MICRO: NORMAL
O+P STL CONC: NORMAL
SPECIMEN SOURCE: NORMAL

## 2018-04-24 ENCOUNTER — OFFICE VISIT (OUTPATIENT)
Dept: FAMILY MEDICINE CLINIC | Age: 48
End: 2018-04-24

## 2018-04-24 DIAGNOSIS — Z71.89 COUNSELING AND COORDINATION OF CARE: Primary | ICD-10-CM

## 2018-06-28 ENCOUNTER — OFFICE VISIT (OUTPATIENT)
Dept: FAMILY MEDICINE CLINIC | Age: 48
End: 2018-06-28

## 2018-06-28 DIAGNOSIS — Z71.89 COUNSELING AND COORDINATION OF CARE: Primary | ICD-10-CM

## 2018-08-21 ENCOUNTER — HOSPITAL ENCOUNTER (EMERGENCY)
Age: 48
Discharge: HOME | End: 2018-08-21
Attending: EMERGENCY MEDICINE
Payer: SUBSIDIZED

## 2018-08-21 VITALS
OXYGEN SATURATION: 98 % | DIASTOLIC BLOOD PRESSURE: 83 MMHG | SYSTOLIC BLOOD PRESSURE: 131 MMHG | HEART RATE: 70 BPM | TEMPERATURE: 98.1 F | WEIGHT: 175 LBS | HEIGHT: 64 IN | RESPIRATION RATE: 14 BRPM | BODY MASS INDEX: 29.88 KG/M2

## 2018-08-21 DIAGNOSIS — G44.229 CHRONIC TENSION-TYPE HEADACHE, NOT INTRACTABLE: Primary | ICD-10-CM

## 2018-08-21 DIAGNOSIS — G89.29 CHRONIC RIGHT-SIDED LOW BACK PAIN, WITH SCIATICA PRESENCE UNSPECIFIED: ICD-10-CM

## 2018-08-21 DIAGNOSIS — M54.5 CHRONIC RIGHT-SIDED LOW BACK PAIN, WITH SCIATICA PRESENCE UNSPECIFIED: ICD-10-CM

## 2018-08-21 PROCEDURE — 99283 EMERGENCY DEPT VISIT LOW MDM: CPT

## 2018-08-21 PROCEDURE — 74011250637 HC RX REV CODE- 250/637: Performed by: FAMILY MEDICINE

## 2018-08-21 RX ORDER — NAPROXEN 500 MG/1
500 TABLET ORAL 2 TIMES DAILY WITH MEALS
Qty: 14 TAB | Refills: 0 | Status: SHIPPED | OUTPATIENT
Start: 2018-08-21 | End: 2018-09-08

## 2018-08-21 RX ORDER — NAPROXEN 250 MG/1
500 TABLET ORAL
Status: COMPLETED | OUTPATIENT
Start: 2018-08-21 | End: 2018-08-21

## 2018-08-21 RX ADMIN — NAPROXEN 500 MG: 250 TABLET ORAL at 09:32

## 2018-08-21 NOTE — ED PROVIDER NOTES
HPI Comments: 52year old male with headache, low back pain, and right leg pain. Generalized waxing and waning headache for 6 months. He cannot describe the pain. He has not taken anything for it. Pain is not exacerbated by any movements or by coughing. He denies any trauma. He says his vision is sometimes blurry but not always. He works outside in the construction industry. He also has lumbar back pain that radiates to his right leg. He says his right leg is weak. He denies any trauma. He has not taken anything for it. He does have some tingling in the right leg. He denies numbness, loss of bowel or bladder incontinence, saddle anesthesia, sexual dysfunction, fevers. There are no other complaints at this time. Patient is a 52 y.o. male presenting with headaches and back pain. The history is provided by the patient. The history is limited by a language barrier. A  was used. Headache    Episode onset: 6 months ago. The problem occurs constantly. The pain is located in the generalized region. The pain is at a severity of 7/10. Associated symptoms include weakness (right leg) and visual change. Pertinent negatives include no fever, no chest pressure, no syncope, no shortness of breath, no tingling, no dizziness, no nausea and no vomiting. He has tried nothing for the symptoms. Back Pain    Episode onset: 6 months. The problem occurs constantly. The pain is associated with no known injury. The pain is present in the lumbar spine. Radiates to: right leg. Associated symptoms include headaches, leg pain, paresthesias and weakness (right leg). Pertinent negatives include no chest pain, no fever, no numbness, no abdominal pain, no bowel incontinence, no bladder incontinence and no tingling. He has tried nothing for the symptoms. No past medical history on file.     Past Surgical History:   Procedure Laterality Date    COLONOSCOPY  9/5/2014              No family history on file.    Social History     Social History    Marital status: SINGLE     Spouse name: N/A    Number of children: N/A    Years of education: N/A     Occupational History    Not on file. Social History Main Topics    Smoking status: Former Smoker     Quit date: 4/26/2016    Smokeless tobacco: Never Used    Alcohol use No      Comment: quit 15 days ago    Drug use: No    Sexual activity: Yes     Other Topics Concern    Not on file     Social History Narrative         ALLERGIES: Review of patient's allergies indicates no known allergies. Review of Systems   Constitutional: Negative for chills and fever. Eyes: Positive for visual disturbance. Respiratory: Negative for shortness of breath. Cardiovascular: Negative for chest pain, leg swelling and syncope. Gastrointestinal: Negative for abdominal pain, bowel incontinence, nausea and vomiting. Genitourinary: Negative for bladder incontinence and difficulty urinating. Musculoskeletal: Positive for back pain. Skin: Negative for rash. Neurological: Positive for weakness (right leg), headaches and paresthesias. Negative for dizziness, tingling, syncope and numbness. All other systems reviewed and are negative. Vitals:    08/21/18 0840 08/21/18 0848   BP: 131/83    Pulse: 70    Resp: 14    Temp: 98.1 °F (36.7 °C)    SpO2: 98%    Weight:  79.4 kg (175 lb)   Height:  5' 4\" (1.626 m)            Physical Exam   Constitutional: He is oriented to person, place, and time. He appears well-developed and well-nourished. No distress. HENT:   Head: Normocephalic and atraumatic. Eyes: Conjunctivae and EOM are normal. Pupils are equal, round, and reactive to light. Neck: Normal range of motion. Neck supple. Cardiovascular: Normal rate, regular rhythm and normal heart sounds. Pulmonary/Chest: Effort normal and breath sounds normal.   Abdominal: Soft. There is no tenderness. Musculoskeletal: Normal range of motion.  He exhibits tenderness (tenderness over trapezius muscles and paravertebral lumbar muscles). He exhibits no edema. 5/5 strength in bilateral upper and lower extremities  Normal gait   Neurological: He is alert and oriented to person, place, and time. He displays normal reflexes. No cranial nerve deficit. He exhibits normal muscle tone. Coordination normal.   Skin: Skin is warm and dry. MDM  Number of Diagnoses or Management Options  Chronic right-sided low back pain, with sciatica presence unspecified:   Chronic tension-type headache, not intractable:   Diagnosis management comments: Likely tension headaches and lumbar strain. He has not tried any conservative therapies. Will try naproxen for a week. He needs to follow up with a primary care doctor. ED Course     Discharge instructions, including prescriptions, reviewed with patient. Instructions provided in Swedish. Reasons to return to the ER were reviewed including worsening symptoms, chest pain, shortness of breath, fever, focal weakness, changes in speech or vision. He understands and agrees. All questions were answered. Discussed with ED attending Dr. Esther Dunaway who agrees.      Procedures

## 2018-08-21 NOTE — DISCHARGE INSTRUCTIONS
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 Las presiones sanguíneas medidas en el Departamento de Emergencia a menudo son anormales. [place] puede deberse a muchas condiciones, incluido el dolor y el estrés. Recomendamos encarecidamente que realice un seguimiento con ann médico de atención primaria para airam evaluación adicional de ann presión arterial.    Información de seguimiento  Seguimiento con detalles Comentarios Información de contacto  DEPARTAMENTO DE EMERGENCIA DE [facility] Vaya a Si los síntomas empeoran [address] [facility] [address]  [phone]  Un médico de atención primaria Programe airam leroy tan pronto sarah sea posible para airam visita en 1 día    Órdenes de descarga  [facility] ED del 21/08/2018 4174 al 21/08/2018 0943  Nidia Wilcox / [place] Schilling Dosis [place] Acción Acción por  21/08/2018 0932 naproxeno (NAPROSYN) tableta 500 mg 500 mg Oral Given LN    Airam theresa de verificación indica a qué hora del día debe tomarse el medicamento.       Mis medicamentos    COMIENCE a franc estos medicamentos    Instrucciones cada dosis para igual  Mañana del mediodía Noche a la hora de acostarse    naproxeno 500 mg tableta  Comúnmente conocido sarah: NAPROSYN    Witt última dosis fue: 09:32 en la manana    Witt próxima dosis es: 09:32 en la noche        [place] 1 tableta por la CELSO Packer (2) veces al día (con las comidas). 500 mg                      PREGUNTE a witt médico sobre Filtec    Instrucciones cada dosis para [employer] del mediodía Noche a la hora de acostarse    loratadina 10 mg tableta  Conocido comúnmente sarah: CLARITIN    Witt última dosis fue:    Witt próxima dosis es: [place] 1 tableta por la boca diariamente. [place] 1 pastilla cada linda por witt mareos y witt nares    10 mg                        Dónde obtener ezra medicamentos    Estos medicamentos fueron enviados a [facility] [address] LYNDA FAITH [address], [address] [facility] [address] [facility] [address] Smith [facility], [address] [place] [address]  Teléfono: [phone]    naproxeno 500 mg tableta     Aprenda sobre cómo tener airam espalda saludable - [ Learning About How to Have a Healthy Back ]  ¿Qué causa el dolor de espalda? El dolor de espalda a menudo es causado por uso excesivo, distensión o lesión. Por ejemplo, las personas frecuentemente se lastiman la espalda al practicar deportes o trabajar en el [facility], al ser sacudidas en un accidente automovilístico o al levantar algo demasiado pesado. El envejecimiento también desempeña un papel. Cathlyn Flaming y los músculos tienden a perder fuerza a medida que envejece, lo cual aumenta las probabilidades de [facility]. Los discos [employer] huesos de la columna vertebral (vértebras) podrían tener desgaste y ya no proporcionar suficiente amortiguamiento entre los [place]. Un disco que sobresale o que se rompe (hernia de disco) puede presionar sobre los nervios, causando dolor de [place].   En [employer], el dolor de espalda es el resultado de la artritis, de vértebras rotas debido a la pérdida de masa ósea (osteoporosis), de airam enfermedad o de un problema de la columna vertebral.  Aunque la mayoría de las personas tienen dolor de espalda en un momento u otro, hay medidas que se pueden franc para reducir la probabilidad de sufrirlo. ¿Cómo puede tener airam espalda saludable? Reduzca la tensión en la espalda mediante airam buena postura  El desplomarse o encorvarse por sí solo kenna vez no cause dolor en la parte baja de la espalda. Danni airam vez que la espalda se ha distendido o lesionado, la baron postura puede empeorar el dolor. · Duerma en airam posición que mantenga las curvas naturales de la espalda y en un colchón cómodo. Duerma de lado con airam almohada entre las rodillas o boca arriba con airam almohada debajo de las rodillas. Estas posiciones pueden reducir la tensión en la espalda. · Manténgase erguido cuando esté de pie o sentado. Tener airam \"buena postura\" por lo general significa que las Ishan, los hombros y las caderas están en línea recta. · Si debe permanecer de pie mucho tiempo, ponga un pie sobre un taburete, un bordillo o Nelma Shoemaker. Cambie de pie cada cierto tiempo. · Siéntese en airam silla que sea lo suficientemente baja sarah para poner ambos pies en el suelo con las rodillas más o menos al nivel de la cadera. Si ann silla o ann escritorio son Dellar Stager, utilice un reposapiés con el fin de elevar las rodillas. Colóquese airam almohada pequeña, airam toalla enrollada o un rollo lumbar en la curva de la espalda si necesita más apoyo. · Pruebe usar airam silla ergonómica con apoyo para las rodillas (\"kneeling chair\"), pues ayuda a inclinar las caderas hacia carolyne. [place] le reduce la presión en la parte baja de la espalda. · Intente sentarse sobre airam pelota de ejercicio. Puede balancearse de lado a lado, lo que ayuda a mantener la espalda relajada. · Al conducir, [place]'s las rodillas james al nivel de las caderas. Siéntese derecho y conduzca con ambas padilla sobre el volante. Los brazos deben estar levemente flexionados. Reduzca la tensión en la espalda levantando objetos con cuidado  · Agáchese doblando solo la cadera y las rodillas.  Si es necesario, ponga airam rodilla en el suelo y extienda la otra rodilla frente a usted en ángulo recto (genuflexión). · Empuje el pecho hacia adelante. [place]eney le ayuda a mantener la parte superior de la espalda derecha mientras mantiene un arco ligero en la parte baja. · Sostenga la carga tan cerca del cuerpo sarah sea posible, a la altura del ombligo. · Utilice los pies para cambiar de dirección con pasos cortos. · Dirija con las caderas al cambiar de dirección. Mantenga los hombros en línea con las caderas [place] se desplaza. · Asiente la carga con cuidado, acuclillándose únicamente con las rodillas y las caderas. Ejercite y estire la espalda  · Felton Bg algo de ejercicio la mayoría de los días de la [place], si ann médico lo aprueba. Puede caminar, correr, nadar o montar en bicicleta. · Estire los músculos de la espalda. Los siguientes son algunos ejercicios que puede probar:  ¨ Acuéstese de espalda y lleve suavemente airam rodilla flexionada hacia el pecho. Vuelva a poner marly pie en el suelo y luego beau lo mismo con la otra rodilla. ¨ Beau inclinaciones de pelvis. Acuéstese de espalda con las rodillas flexionadas. Contraiga los músculos abdominales. Hunda el ombligo hacia adentro y [place], en dirección a las costillas. Deberá sentir sarah si la espalda estuviera ejerciendo presión sobre el suelo y que las caderas y la pelvis se despegan levemente del suelo. Mantenga la posición jose 6 segundos mientras respira de [place]ossy pausada. ¨ Siéntese con la espalda contra la pared. · Mantenga brandon los músculos del tronco. Los músculos de la espalda, el abdomen y los glúteos sostienen la columna vertebral.  ¨ Hunda el abdomen e imagine que está llevando el ombligo hacia la columna. Mantenga la posición jose 6 segundos y luego relájese. Recuerde seguir respirando de manera normal [employer]. ¨ Beau abdominales. Hágalos siempre con las rodillas dobladas.  Mantenga la parte baja de la espalda sobre el suelo y [employer] hombros hacia las rodillas con un movimiento lento y uniforme. Mantenga los brazos cruzados sobre el pecho. Si esto le causa molestias en el jorge, pruebe a poner las padilla detrás del jorge (no de la tushar), con los codos abiertos. ¨ Acuéstese boca arriba con las rodillas flexionadas y los pies apoyados sobre el suelo. Contraiga los músculos abdominales y luego empuje con los pies y eleve las nalgas algunas pulgadas. Mantenga la posición jose 6 segundos mientras continúa respirando de [place]y normal y luego vuelva a bajar lentamente hacia el suelo. Repita de 8 a 12 veces. ¨ Si le gusta el ejercicio en xu, pruebe con Pilates o yoga. Estas clases tienen posiciones que fortalecen los músculos del tronco.  Lleve un estilo de asuncion saludable  · Mantenga un peso saludable para evitar sobrecargar la espalda. · No fume. Fumar aumenta el riesgo de osteoporosis, que debilita la columna vertebral. Si necesita ayuda para dejar de fumar, hable con ann médico sobre programas y medicamentos para dejar de fumar. Estos pueden aumentar ezra probabilidades de dejar el hábito para siempre. ¿Dónde puede encontrar más información en inglés? Tommie Fernandez a http://chuck-stephani.info/. Escriba L315 en la búsqueda para aprender más acerca de \"Aprenda sobre cómo tener airam espalda saludable - [ Learning About How to Have a Healthy Back ]. \"  Revisado: 29 noviembre, 2017  Versión del contenido: 11.7  © 0514-8378 [employer]. Las instrucciones de cuidado fueron adaptadas bajo licencia por Good Help Connections (which disclaims liability or warranty for this information). Si usted tiene [place] afección médica o sobre estas instrucciones, siempre pregunte a ann profesional de amari. [facility], Incorporated niega toda garantía o responsabilidad por ann uso de esta información. Dolor de espalda:  Instrucciones de cuidado - [ Back Pain: Care Instructions ]  Instrucciones de cuidado    El dolor de espalda tiene muchas causas posibles. Con frecuencia, está relacionado con problemas en los músculos y ligamentos de la espalda. También podría estar relacionado con problemas de los nervios, discos o huesos de la espalda. Moverse, levantar algo, ponerse de pie, sentarse o dormir de [employer] incómoda pueden forzar la espalda. Algunas veces no se da cuenta de que tiene airam lesión [employer] tarde. La artritis es otra causa común del dolor de espalda. Aunque kenna vez duela mucho, el dolor de espalda por lo general mejora por sí mismo en varias semanas. [address] personas se recuperan en 12 semanas o menos. Hacer un buen tratamiento en el hogar y tener cuidado de no forzar la espalda puede ayudar a que se sienta mejor más pronto. La atención de seguimiento es airam parte clave de ann tratamiento y seguridad. Asegúrese de hacer y acudir a todas las citas, y llame a ann médico si está teniendo problemas. También es airam buena idea saber los resultados de ezra exámenes y mantener airam lista de los medicamentos que rajiv. ¿Cómo puede cuidarse en el hogar? · Siéntese o acuéstese en las posiciones más cómodas y que reduzcan el dolor. Trate airam de estas posiciones al acostarse:  ¨ Acuéstese boca arriba con las rodillas dobladas y apoyadas sobre almohadones grandes. ¨ Acuéstese en el piso con las piernas sobre el asiento de un sofá o airam silla. ¨ Acuéstese de lado con las rodillas y caderas dobladas y [place] entre las piernas. ¨ Acuéstese boca abajo siempre que esta posición no empeore el dolor. · No se siente en la cama y evite los sofás blandos y las posiciones torcidas. El reposo en cama puede aliviar el dolor al principio, renetta retrasa la sanación. Evite reposar en la cama después del primer día de dolor de espalda. · Cambie de posición cada 30 minutos. Si debe sentarse por [employer], párese y descanse de estar sentado.  Levántese y camine, o acuéstese en airam posición cómoda. · Pruebe usar airam almohadilla térmica a temperatura baja o mediana jose 15 a 20 minutos cada 2 ó 3 horas. Pruebe airam ducha tibia en vez de airam sesión con la almohadilla térmica. · También puede probar airam compresa de hielo jose 10 a 15 minutos cada 2 a 3 horas. Póngase un paño pham entre la compresa de hielo y la piel. · [employer] analgésicos (medicamentos para el dolor) exactamente según las indicaciones. ¨ Si el médico le recetó un analgésico, tómelo según las indicaciones. ¨ Si no está tomando un analgésico recetado, pregúntele a ann médico si puede franc gume de [employer]. · Harjeet caminatas cortas varias veces al día. Usted puede comenzar con 5 a 10 minutos, 3 ó 4 veces al día, y aumentar progresivamente hasta lograr caminatas más largas. Camine en superficies rosa y evite colinas y escaleras hasta que la espalda esté mejor. · Regrese al [place] y otras actividades lo más pronto posible. El descanso continuo sin actividad por lo general no es palafox para la espalda. · Para prevenir el dolor de espalda en el futuro, harjeet ejercicios para estirar y fortalecer la espalda y el abdomen. Aprenda a Time [place], técnicas seguras para levantar peso y la mecánica corporal apropiada. ¿Cuándo debe pedir ayuda? Llame a ann médico ahora mismo o busque atención médica inmediata si:    · Tiene un entumecimiento nuevo o peor en las piernas.     · Tiene debilidad nueva o peor en las piernas. ([place] puede hacer que le resulte difícil ponerse de pie).   · Pierde el control de la vejiga o los intestinos.    Preste especial atención a los cambios en ann amari y asegúrese de comunicarse con ann médico si:    · Tiene fiebre, pierde peso o no se siente jeancarlos.     · No mejora sarah se esperaba. ¿Dónde puede encontrar más información en inglés? Saw Shamir a [email or web address]-stephani.info/. Dona Borrero Q154 en la búsqueda para aprender Anabella Becerra de Tho"Rashidor de efremalda:  Instrucciones de cuidado - [ Back Pain: Care Instructions ]. \"  Revisado: 29 noviembre, 2017  Versión del contenido: 11.7  © 8923-9979 Healthwise, Incorporated. Las instrucciones de cuidado fueron adaptadas bajo licencia por Good Help Connections (which disclaims liability or warranty for this information). Si usted tiene [place] afección médica o sobre estas instrucciones, siempre pregunte a ann profesional de amari. Healthwise, Incorporated niega toda garantía o responsabilidad por ann uso de esta información. Aprenda sobre el alivio del dolor de espalda - [ Learning About Relief for Back Pain ]  ¿Qué son la tensión y la distensión en la espalda? La distensión en la espalda ocurre cuando usted estira [place], o fuerza, un músculo de la espalda. Usted puede lesionarse la espalda en un accidente o cuando hace ejercicio o levanta algo. La mayoría de los wilman de espalda mejoran con reposo y con el tiempo. Usted puede cuidarse en el hogar para ayudar a que ann espalda sane. ¿Qué es lo kolby que puede hacer para aliviar el dolor de espalda? Cuando empiece a sentir dolor de espalda, siga estos pasos:  · Camine. Dé un paseo corto (10 a 20 minutos) sobre airam superficie plana (sin pendientes, donde no haya colinas o escaleras) cada 2 o 3 horas. Solo camine distancias que pueda manejar sin dolor, especialmente dolor en las piernas. · Relájese. Encuentre airam posición cómoda para descansar. Algunas personas se sienten cómodas en el suelo o en airam cama de firmeza media con airam almohada pequeña debajo de la tushar y otra debajo de las rodillas. Otras prefieren acostarse de lado con airam almohada entre las rodillas. No permanezca en airam posición por [employer]. · Pruebe con calor o hielo. Trate de [employer] almohadilla térmica a baja o media temperatura, o tome airam ducha tibia de 15 o 20 minutos cada 2 o 3 horas. O puede comprar vendas térmicas desechables que se usan airam shayla vez por hasta 8 horas.  [address] probar compresas de hielo entre 10 y 15 minutos cada 2 o 3 horas. Puede usar airam compresa de hielo o airam bolsa de verduras congeladas envuelta en airam toalla delgada. No existe evidencia sólida de que el calor o el hielo ayuden, renetta puede probarlos para james si son de [facility]. También podría convenirle probar alternar [employer] frío y el calor. · [employer] analgésicos (medicamentos para el dolor) exactamente según las indicaciones. ¨ Si el médico le recetó un analgésico, tómelo según las indicaciones. ¨ Si no está tomando un analgésico recetado, pregúntele a ann médico si puede franc gume de [employer]. ¿Qué más puede hacer? · Weakley Catrachito estiramientos y ejercicio. Los ejercicios que incrementan la flexibilidad pueden aliviar el dolor y facilitar que los músculos mantengan a la columna vertebral en airam buena posición neutra. Y no se olvide de seguir caminando. · Hágase masajes usted mismo. Usted puede darse masaje para relajarse después del trabajo o de la escuela o para sentirse lleno de energía en la mañana. No es difícil [employer], las padilla o el jorge. El darse masaje usted mismo funciona mejor si está con ropa cómoda y sentado o [place] en airam posición cómoda. Utilice aceite o loción para masajearse la piel directamente. · Reduzca el estrés. El dolor de espalda puede llevar a un círculo stanley: La angustia por el dolor tensa los músculos en la espalda, lo que a ann vez causa más dolor. Aprenda a relajar la mente y los músculos para disminuir el estrés. ¿Dónde puede encontrar más información en inglés? Evens Mi a http://chuck-stephani.info/. Alber K574 en la búsqueda para aprender más acerca de \"Aprenda sobre el [place] del dolor de espalda - [ Learning About Relief for Back Pain ]. \"  Revisado: 21 marzo, 2017  Versión del contenido: 11.5  © 7715-4429 Healthwise, Incorporated.  Las instrucciones de cuidado fueron adaptadas bajo licencia por Good Help Connections (which disclaims liability or warrantrubi for this information). Si usted tiene [place] afección médica o sobre estas instrucciones, siempre pregunte a ann profesional de amari. [facility], Incorporated niega toda garantía o responsabilidad por ann uso de esta información. Esperamos teodoro abordado todas ezra inquietudes médicas. El examen y el tratamiento que recibió en el Departamento de Emergencia fueron por un problema emergente y no fueron concebidos sarah atención [place]. Es importante que beau un seguimiento con ann (s) proveedor (es) de atención médica para recibir atención continua. Si ezra síntomas empeoran o no mejoran sarah se esperaba, y no puede comunicarse con ann (s) proveedor (es) de atención médica habitual, debe regresar al Departamento de Emergencia. La atención médica de molly está experimentando un cambio tremendo, y las encuestas de satisfacción del paciente son Jose C de las muchas herramientas para evaluar la calidad de la atención [place]. Puede recibir airam encuesta de la organización [facility] con respecto a ann experiencia en el Departamento de Emergencia. Espero que ann experiencia haya sido completamente positiva, particularmente la [employer] brindé. [place] kenna, participa en la encuesta; cualquier cosa menos que excelente no cumple mis expectativas o intenciones. [address] [facility] y [address] [place] [address] en medidas de calidad de atención reconocidas a nivel nacional. Si ann presión arterial es superior a 120/80, sarah se informa a continuación, instamos a que busque atención médica para abordar el potencial de presión arterial leticia, comúnmente conocida sarah hipertensión. La hipertensión puede ser hereditaria o puede ser causada por ciertas condiciones médicas, dolor, estrés o \"síndrome de bata lakeshia\". Beau airam leroy con ann (s) proveedor (es) de atención médica para el seguimiento de ann visita al Departamento de Emergencia.   Marie por permitirnos brindarle [facility] médica hoy. Nos damos cuenta de que tiene muchas opciones para ezra necesidades de atención de emergencia. Por favor, elíjanos en el futuro para cualquier atención médica continua. Seguimiento con [employer] qué?  Información de contacto  DEPARTAMENTO DE EMERGENCIA DE [facility] Christine Jemma a Si los síntomas empeoran [address] [facility]  [phone]  Un médico de atención primaria Programe airam leroy tan pronto sarah sea posible para airam visita en 1 día

## 2018-08-21 NOTE — ED TRIAGE NOTES
Pt. 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 Also c/o lower back pain for a few months that goes down right leg, no injury or fall noted.

## 2018-09-08 RX ORDER — NAPROXEN 500 MG/1
500 TABLET ORAL 2 TIMES DAILY WITH MEALS
Qty: 14 TAB | Refills: 0 | Status: SHIPPED | OUTPATIENT
Start: 2018-09-08 | End: 2019-03-28 | Stop reason: SDUPTHER

## 2019-03-26 ENCOUNTER — HOSPITAL ENCOUNTER (OUTPATIENT)
Dept: LAB | Age: 49
Discharge: HOME | End: 2019-03-26

## 2019-03-26 ENCOUNTER — OFFICE VISIT (OUTPATIENT)
Dept: FAMILY MEDICINE CLINIC | Age: 49
End: 2019-03-26

## 2019-03-26 VITALS
SYSTOLIC BLOOD PRESSURE: 123 MMHG | DIASTOLIC BLOOD PRESSURE: 85 MMHG | WEIGHT: 171 LBS | TEMPERATURE: 97.8 F | HEIGHT: 64 IN | BODY MASS INDEX: 29.19 KG/M2 | HEART RATE: 61 BPM | OXYGEN SATURATION: 100 %

## 2019-03-26 DIAGNOSIS — G44.229 CHRONIC TENSION-TYPE HEADACHE, NOT INTRACTABLE: ICD-10-CM

## 2019-03-26 DIAGNOSIS — G89.29 CHRONIC RIGHT-SIDED LOW BACK PAIN, WITH SCIATICA PRESENCE UNSPECIFIED: ICD-10-CM

## 2019-03-26 DIAGNOSIS — N41.0 ACUTE PROSTATITIS: ICD-10-CM

## 2019-03-26 DIAGNOSIS — Z13.9 ENCOUNTER FOR SCREENING: ICD-10-CM

## 2019-03-26 DIAGNOSIS — R53.83 FATIGUE, UNSPECIFIED TYPE: ICD-10-CM

## 2019-03-26 DIAGNOSIS — R53.83 FATIGUE, UNSPECIFIED TYPE: Primary | ICD-10-CM

## 2019-03-26 DIAGNOSIS — M54.5 CHRONIC RIGHT-SIDED LOW BACK PAIN, WITH SCIATICA PRESENCE UNSPECIFIED: ICD-10-CM

## 2019-03-26 LAB
BILIRUB UR QL STRIP: NEGATIVE
GLUCOSE POC: NORMAL MG/DL
GLUCOSE UR-MCNC: NEGATIVE MG/DL
HGB BLD-MCNC: 13.9 G/DL
KETONES P FAST UR STRIP-MCNC: NEGATIVE MG/DL
PH UR STRIP: 7 [PH] (ref 4.6–8)
PROT UR QL STRIP: NEGATIVE
SP GR UR STRIP: 1.01 (ref 1–1.03)
TSH SERPL DL<=0.05 MIU/L-ACNC: 1.29 UIU/ML (ref 0.36–3.74)
UA UROBILINOGEN AMB POC: NORMAL (ref 0.2–1)
URINALYSIS CLARITY POC: CLEAR
URINALYSIS COLOR POC: YELLOW
URINE BLOOD POC: NORMAL
URINE LEUKOCYTES POC: NORMAL
URINE NITRITES POC: NEGATIVE

## 2019-03-26 PROCEDURE — 87491 CHLMYD TRACH DNA AMP PROBE: CPT

## 2019-03-26 PROCEDURE — 84443 ASSAY THYROID STIM HORMONE: CPT

## 2019-03-26 PROCEDURE — 87086 URINE CULTURE/COLONY COUNT: CPT

## 2019-03-26 RX ORDER — CIPROFLOXACIN 500 MG/1
500 TABLET ORAL 2 TIMES DAILY
Qty: 14 TAB | Refills: 0 | Status: SHIPPED | OUTPATIENT
Start: 2019-03-26 | End: 2019-04-02

## 2019-03-26 NOTE — PATIENT INSTRUCTIONS
Disfunción eréctil: Instrucciones de cuidado - [ Erectile Dysfunction: Care Instructions ]  Instrucciones de cuidado    Un hombre tiene disfunción eréctil (ED, por ezra siglas en inglés) cuando no puede tener o mantener de [employer] regular airam erección que le permita tener relaciones sexuales satisfactorias. Es posible que no pueda tener airam erección en absoluto. O puede no lograr airam que sea lo suficientemente firme o duradera para completar el coito. La ED no es lo mismo que tener dificultades para tener airam erección de vez en cuando. [place] es común y le sucede a la mayoría de los hombres en algún momento. La ED puede ser causada por problemas de los vasos sanguíneos, los nervios o las hormonas. Puede ser causada por diabetes, enfermedad cardíaca y lesiones. Los trastornos [facility], sarah la esclerosis múltiple o la enfermedad de Parkinson, también pueden ser causas. La ED además puede ser causada por medicamentos, alcohol y tabaco. O puede tener causas sarah depresión, estrés, tristeza o [employer]. La atención de seguimiento es airam parte clave de ann tratamiento y seguridad. Asegúrese de hacer y acudir a todas las citas, y llame a ann médico si está teniendo problemas. También es airam buena idea saber los resultados de ezra exámenes y mantener airam lista de los medicamentos que rajiv. ¿Cómo puede cuidarse en el hogar?   Estilo de asuncion    · Limite el alcohol. No tome más de 2 bebidas al día.     · No fume. Fumar hace que sea más difícil que los vasos sanguíneos del pene se relajen para permitir la entrada de [place]. Si necesita ayuda para dejar de fumar, hable con ann médico sobre programas y medicamentos para dejar de fumar. Estos pueden aumentar ezra probabilidades de dejar el hábito para siempre.     · No use cocaína, heroína ni otras drogas ilegales.     · Intente reducir el estrés.     · Dese tiempo para ajustarse a los [place].  Los [facility], ann tang, [place], [place] asuncion doméstica y otros aspectos pueden causar estrés. Y el estrés puede causar problemas de Jerline Azul con ann ksenia    · No suponga que sabe lo que le gusta a ann ksenia en cuanto al sexo. Puede estar equivocado. Hablen sobre lo que cada gume disfruta o no.     · Tómense tiempo fuera del dormitorio para hablar sobre ann asuncion sexual. Si zainab el sexo por temor a los problemas de erección, ann ksenia puede preocuparse y pensar que ya no tiene interés.     · Si usted y ann ksenia tienen problemas para hablar de sexo, acudan a un terapeuta que les puede ayudar a hablar sobre el stefany. Leer libros con ann ksenia sobre amari sexual también podría ayudar.     · Relájese. Tómese tiempo con los juegos previos. Preocuparse por ezra erecciones puede hacer que las cosas empeoren. Medicamentos    · Informe a ann médico sobre todos los [place] rajiv. ? Algunos medicamentos pueden causar problemas de erección. ? Algunos medicamentos pueden tener interacciones peligrosas con los medicamentos recetados para la ED, incluidos los medicamentos de venta amanda y los productos herbarios.     · Sea gordon con los medicamentos. [place] los medicamentos exactamente sarah se los recetaron. Llame a ann médico si tutu que está teniendo un problema con ezra medicamentos.     · Hable con ann médico acerca de probar un medicamento para ayudarle a mantener la erección. Puede ser un medicamento sarah Cialis, Levitra o Viagra. Si tiene un problema cardíaco, pregúntele a ann médico si son seguros para usted. No tome estos medicamentos si [place] nitroglicerina u otros medicamentos que contengan nitratos. ¿Cuándo debe pedir ayuda? Llame a ann médico ahora mismo o busque atención médica inmediata si:    · Tiene airam erección que dura más de 3 horas.     · Ha tomado un medicamento para mejorar la erección (sarah Cialis, Levitra o Viagra) en las últimas 24 horas y tiene síntomas de angina, sarah dolor o presión en el pecho.  No tome nitroglicerina.     · Tiene problemas de erección junto con dolor o dificultad para orinar, fiebre o dolor en la parte baja del abdomen.    Preste especial atención a los cambios en ann amari y asegúrese de comunicarse con ann médico si tiene algún problema. ¿Dónde puede encontrar más información en inglés? Astrid Guzman a [email or web address] [facility] J531 en la búsqueda para aprender más acerca de \"Disfunción eréctil: Instrucciones de cuidado - [ Erectile Dysfunction: Care Instructions ]. \"  Revisado: 26 septiembre, 2018  Versión del contenido: 11.9  © 7388-2093 Healthwise, Incorporated. Las instrucciones de cuidado fueron adaptadas bajo licencia por [employer] (which disclaims liability or warranty for this information). Si usted tiene [place] afección médica o sobre estas instrucciones, siempre pregunte a ann profesional de amari. Healthwise, Incorporated niega toda garantía o responsabilidad por ann uso de esta información. Prostatitis: Instrucciones de cuidado - [ Prostatitis: Care Instructions ]  Instrucciones de cuidado    La próstata es un Jagu[place] con forma de nuez. Se encuentra jasbir debajo de la vejiga del hombre. [place] a la uretra, el conducto que transporta la orina por el penSan Juan Regional Medical Center exterior del [place]. La prostatitis es airam afección médica dolorosa causada por inflamación o infección de la próstata. A veces, la afección es causada por bacterias, renetta a menudo se desconoce la causa. La prostatitis causada por bacterias normalmente se trata con cuidado personal y antibióticos. La atención de seguimiento es airam parte clave de ann tratamiento y seguridad. Asegúrese de hacer y acudir a todas las citas, y llame a ann médico si está teniendo problemas. También es airam buena idea saber los resultados de ezra exámenes y mantener airam lista de los medicamentos que rajiv. ¿Cómo puede cuidarse en el hogar? · Si ann médico le recetó antibióticos, tómelos según las indicaciones. No deje de tomarlos por el hecho de sentirse mejor. Debe franc todos los antibióticos hasta terminarlos. · [place] un analgésico (medicamento para el dolor) de venta amanda, sarah acetaminofén (Tylenol), ibuprofeno (Advil, Motrin) o naproxeno (Aleve). Sea gordon con los medicamentos. [place] y siga todas las instrucciones de la [place]. · Dese dandre tibios para ayudar a aliviar el dolor. · Esforzarse (pujar) para evacuar el intestino puede ser doloroso si ann próstata está inflamada. Evite el estreñimiento. ? Incluya en ann dieta diaria frutas, verduras, frijoles (habichuelas) y granos integrales. Estos alimentos son ricos en fibra. ? Josi abundantes líquidos, los suficientes sarah para que ann orina sea de color amarillo david o transparente sarah el agua. Si tiene [employer], del corazón o del hígado y tiene que Hermelinda's líquidos, hable con ann médico antes de aumentar ann consumo. ? Beau algo de ejercicio todos los grant. Aumente el tiempo en forma gradual hasta entre 30 y 61 minutos al día, jose 5 o más días a la semana. ? Si es necesario, tome un suplemento de [place], sarah Citrucel o Metamucil, todos los GRASSE. [place] y siga todas las indicaciones de la [place]. ? Programe tiempo todos los días para evacuar el intestino. Youlanda Pilar rutina diaria podría ayudar. Tómese ann tiempo y no se esfuerce cuando esté evacuando. · Evite el alcohol, la cafeína y las [address] condimentadas, sobre todo si hacen que ezra síntomas empeoren. ¿Cuándo debe pedir ayuda? Llame a ann médico ahora mismo o busque atención médica inmediata si:    · Tiene síntomas de airam infección urinaria. Por ejemplo:  ? Tiene eliazar o pus en la orina. ? Tiene dolor de espalda jasbir debajo de las [place]. [place] se llama dolor en el flanco.  ? Tiene fiebre, escalofríos o wilman por todo el cuerpo. ? Siente dolor al [place]. ?  Tiene dolor en la chirag o en el abdomen.    Preste especial atención a los cambios en ann amari y asegúrese de comunicarse con ann médico si:    · Tiene dificultades para empezar a orinar o no puede vaciar la vejiga por completo.     · No mejora sarah se esperaba. ¿Dónde puede encontrar más información en inglés? Karli Wilson a [email or web address] [place] I285 en la búsqueda para aprender más acerca de \"Prostatitis: Instrucciones de cuidado - [ Prostatitis: Care Instructions ]. \"  Revisado: 26 septiembre, 2018  Versión del contenido: 11.9  © 0061-5008 Healthwise, Incorporated. Las instrucciones de cuidado fueron adaptadas bajo licencia por [employer] (which disclaims liability or warranty for this information). Si usted tiene [place] afección médica o sobre estas instrucciones, siempre pregunte a ann profesional de amari. Healthwise, Incorporated niega toda garantía o responsabilidad por ann uso de esta información.

## 2019-03-26 NOTE — PROGRESS NOTES
Printed AVS, provided to pt and reviewed. Pt indicated understanding and had no questions. Told pt that rx's have been sent to pharmacy and they should be ready for  in approximately 2 hrs. Reviewed the medication with the pt. The int # [id number]. Pt told to return to clinic as a walk-in as needed, if not better. We will call him if he needs tx.  Itzel Sears RN

## 2019-03-26 NOTE — PROGRESS NOTES
Coordination of Care  1. Have you been to the ER, urgent care clinic since your last visit? Hospitalized since your last visit? No    2. Have you seen or consulted any other health care providers outside of the [address] [place] [address] since your last visit? Include any pap smears or colon screening. No    Does the patient need refills? NO    Learning Assessment.   Results for orders placed or performed in visit on 03/26/19   AMB POC HEMOGLOBIN (HGB)   Result Value Ref Range    Hemoglobin (POC) 13.9    AMB POC GLUCOSE BLOOD, BY GLUCOSE MONITORING DEVICE   Result Value Ref Range    Glucose POC fast 99 mg/dL   AMB POC URINALYSIS DIP STICK MANUAL W/O MICRO   Result Value Ref Range    Color (UA POC) Yellow     Clarity (UA POC) Clear     Glucose (UA POC) Negative Negative    Bilirubin (UA POC) Negative Negative    Ketones (UA POC) Negative Negative    Specific gravity (UA POC) 1.015 1.001 - 1.035    Blood (UA POC) Trace Negative    pH (UA POC) 7.0 4.6 - 8.0    Protein (UA POC) Negative Negative    Urobilinogen (UA POC) normal 0.2 - 1    Nitrites (UA POC) Negative Negative    Leukocyte esterase (UA POC) 1+ Negative
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
## 2019-03-27 LAB
C TRACH DNA SPEC QL NAA+PROBE: NEGATIVE
N GONORRHOEA DNA SPEC QL NAA+PROBE: NEGATIVE
SAMPLE TYPE: NORMAL
SERVICE CMNT-IMP: NORMAL
SPECIMEN SOURCE: NORMAL

## 2019-03-28 LAB
BACTERIA SPEC CULT: NORMAL
CC UR VC: NORMAL
SERVICE CMNT-IMP: NORMAL

## 2019-03-28 RX ORDER — NAPROXEN 500 MG/1
500 TABLET ORAL 2 TIMES DAILY WITH MEALS
Qty: 14 TAB | Refills: 0 | Status: SHIPPED | OUTPATIENT
Start: 2019-03-28 | End: 2019-10-22

## 2019-06-27 ENCOUNTER — OFFICE VISIT (OUTPATIENT)
Dept: FAMILY MEDICINE CLINIC | Age: 49
End: 2019-06-27

## 2019-06-27 VITALS
HEIGHT: 64 IN | DIASTOLIC BLOOD PRESSURE: 85 MMHG | BODY MASS INDEX: 29.23 KG/M2 | HEART RATE: 54 BPM | WEIGHT: 171.2 LBS | TEMPERATURE: 97.3 F | SYSTOLIC BLOOD PRESSURE: 138 MMHG | OXYGEN SATURATION: 100 %

## 2019-06-27 DIAGNOSIS — L25.5 DERMATITIS DUE TO PLANTS, INCLUDING POISON IVY, SUMAC, AND OAK: Primary | ICD-10-CM

## 2019-06-27 RX ORDER — PREDNISONE 10 MG/1
TABLET ORAL
Qty: 63 TAB | Refills: 0 | Status: SHIPPED | OUTPATIENT
Start: 2019-06-27 | End: 2019-07-02 | Stop reason: SDUPTHER

## 2019-06-27 NOTE — PATIENT INSTRUCTIONS
Brooks Rodriguez y zumaque venenosos: Instrucciones de cuidado - [ Prudencio Resides, [place], and [place]: Care Instructions ]  Instrucciones de cuidado    La hiedra, el landy y el zumaque venenosos son plantas que pueden causar salpullido cuando entran en contacto con la piel. El enrojecimiento y la comezón del salpullido pueden aparecer en forma de vetas o akiko que pueden causar ampollas llenas de líquido o grandes ronchas. El salpullido es causado por airam reacción alérgica al aceite de la planta. El salpullido puede ocurrir cuando usted toca la planta, o al tocar airam prenda de vestir, el pelaje de [place], equipos deportivos, herramientas de jardinería u otros objetos que hayan estado en contacto con la planta. Usted no puede contraer ni transmitir el salpullido aunque haya tocado el salpullido o el líquido de las [place], ya que el aceite de la planta habrá sido absorbido o retirado de la piel. Puede parecer que el salpullido se está propagando, renetta aún está desarrollándose a partir de un contacto previo o jeancarlos usted ha tocado algo que todavía tiene aceite de la planta. La atención de seguimiento es airam parte clave de ann tratamiento y seguridad. Asegúrese de hacer y acudir a todas las citas, y llame a ann médico si está teniendo problemas. También es airam buena idea saber los resultados de ezra exámenes y mantener airam lista de los medicamentos que rajiv. ¿Cómo puede cuidarse en el hogar? · Si ann médico le recetó airam crema, úsela según las indicaciones. Si ann médico le recetó medicamentos, tómelos exactamente según las indicaciones. Llame a ann médico si tutu estar teniendo problemas con ann medicamento. · Use compresas frías y húmedas para reducir la comezón. · Manténgase fresco y evite el sol. · Deje el salpullido en contacto con el aire. · Lave todas las prendas de vestir y otros objetos que hayan estado en contacto con el aceite de la planta.   · Evite el uso de la mayoría de las lociones y [employer] salpullido se cure. Airam loción de calamina puede ayudarle a aliviar los síntomas del salpullido. Úsela 3 o 4 veces al día. Cómo prevenir la exposición a la hiedra venenosa  Si sabe que va a estar cerca de la hiedra, del landy o del zumaque venenosos, puede probar estas opciones:  · Use un producto diseñado para ayudar a prevenir que el aceite de la planta entre en contacto con la piel. Estos productos, sarah Ivy X Pre-Contact Skin Solution, vienen en lociones, aerosoles o toallitas. Usted puede aplicarse el producto en la piel antes de salir al [facility]. · Si usted no utilizó un producto preventivo y ha tenido contacto con el aceite de la planta, límpiese la piel lo más pronto posible. Use un producto sarah Tecnu Original Outdoor Skin Cleanser. Estos productos también pueden usarse para limpiar el aceite de las plantas de la ropa o herramientas. ¿Cuándo debe pedir ayuda? Llame a ann médico ahora mismo o busque atención médica inmediata si:    · Ann salpullido empeora, y Oralee Abts a sentirse mal y Adrianne Croft, rigidez en el jorge, náuseas y vómito.     · Tiene señales de infección, tales sarah:  ? Un aumento del dolor, la hinchazón, el enrojecimiento o la temperatura en la angie. ? Vetas rojizas que comienzan donde está el salpullido. ? Pus que sale del salpullido. ? Brien Anita especial atención a los cambios en ann amari y asegúrese de comunicarse con ann médico si:    · Tiene ampollas o moretones nuevos, o el salpullido se extiende y parece airam quemadura solar.     · El salpullido TRACY, o vuelza después de james teodoro desaparecido.     · Piensa que un medicamento está empeorando el salpullido.     · El salpullido no desaparece después de 1 a 2 semanas de tratamiento en el hogar.     · Siente dolor en las articulaciones o en otras partes del cuerpo junto con el salpullido. ¿Dónde puede encontrar más información en inglés? Oralee Nixon a [email or web address]   Xochilt Crest U242 en la búsqueda para aprender [employer] de landy Lao y nicola venenosos: Instrucciones de cuidado - [ Rosalia Shaw, and [place]: Care Instructions ]. \"  Revisado: 17 nader, 2018  Versión del contenido: 11.9  © 1615-0730 [place] [place]. Las instrucciones de cuidado fueron adaptadas bajo licencia por [employer] (which disclaims liability or warranty for this information). Si usted tiene [place] afección médica o sobre estas instrucciones, siempre pregunte a ann profesional de amari. [place], [place] niega toda garantía o responsabilidad por ann uso de esta información.

## 2019-06-27 NOTE — PROGRESS NOTES
Coordination of Care  1. Have you been to the ER, urgent care clinic since your last visit? Hospitalized since your last visit? No    2. Have you seen or consulted any other health care providers outside of the [address] [place] [address] since your last visit? Include any pap smears or colon screening. No    Does the patient need refills?  NO    Learning Assessment Complete? yes    3 most recent PHQ Screens 6/27/2019   Little interest or pleasure in doing things Nearly every day   Feeling down, depressed, irritable, or hopeless Nearly every day   Total Score PHQ 2 6   Thoughts of being better off dead, or hurting yourself in some way Several days

## 2019-06-27 NOTE — PROGRESS NOTES
Assessment/Plan:       ICD-10-CM ICD-9-CM    1. Dermatitis due to plants, including poison ivy, sumac, and oak L25.5 692.6 predniSONE (DELTASONE) 10 mg tablet         [facility] [address] [address]  [address] AT [facility]  [address] [facility] [address]  Phone: [phone] Fax: [phone] - [place], [place] - [address] [facility]  [address] [address]  Phone: [phone] Fax: [phone]    [address] [facility] 149, [address] [facility] [address]  [address] [address] [address]  Phone: [phone] Fax: [phone]      [facility]  Subjective:     Chief Complaint   Patient presents with    Rash     bilateral leg rash. swelling     Knee Pain     cracking in bilateral knees but the right side is worse. Zach Bai is a 50 y.o. OTHER male. HPI:    Rash  He was cleaning a house and had plants. Started a little a month ago and has increased. Knee  Feels stabbing pains going up stairs. Denies SI/HI. He  has no past medical history on file. Review of Systems: Negative for: fever, chest pain, shortness of breath, leg swelling, exertional dyspnea, palpitations. Current Medications:   Current Outpatient Medications on File Prior to Visit   Medication Sig    naproxen (NAPROSYN) 500 mg tablet Take 1 Tab by mouth two (2) times daily (with meals).  loratadine (CLARITIN) 10 mg tablet Take 1 Tab by mouth daily. [place] 1 pastilla cada linda por ann mareos y ann nares     No current facility-administered medications on file prior to visit. Social History: He  reports that he quit smoking about 3 years ago. He has never used smokeless tobacco. He reports that he does not drink alcohol or use drugs.   Objective:     Vitals:    06/27/19 0938   BP: 138/85   Pulse: (!) 54   Temp: 97.3 °F (36.3 °C)   TempSrc: Oral   SpO2: 100%   Weight: 171 lb 3.2 oz (77.7 kg)   Height: 5' 4.02\" (1.626 m)    No LMP for male patient. Wt Readings from Last 2 Encounters:   06/27/19 171 lb 3.2 oz (77.7 kg)   03/26/19 171 lb (77.6 kg)     Physical Examination:  General appearance - well developed, no acute distress. Chest - clear to auscultation. Heart - regular rate and rhythm without murmurs, rubs, or gallops. Abdomen - bowel sounds present x 4, NT, ND. Extremities - pulses intact. No peripheral edema. Assessment/Plan:   Diagnoses and all orders for this visit:    1. Dermatitis due to plants, including poison ivy, sumac, and oak  -     predniSONE (DELTASONE) 10 mg tablet; 6 po qd x 3d; 5 po qd x 3d; 4 po qd x 3d; 3 po qd x 3d; 2 po qd x 3d; 1 po qd x 3d    Eder Lema DNP, FNP-BC, BC-ADM  Brendne Mccarty expressed understanding of this plan.

## 2019-06-27 NOTE — PROGRESS NOTES
Reviewed AVS, prescription and pharmacy location with patient. AVS printed and given. Patient to RTC as a walk in on a later date to discuss knee pain. This has been fully explained to the patient, who indicates understanding and agrees with plan. No further questions at this time.  Beba Ann RN

## 2019-07-01 ENCOUNTER — TELEPHONE (OUTPATIENT)
Dept: FAMILY MEDICINE CLINIC | Age: 49
End: 2019-07-01

## 2019-07-01 DIAGNOSIS — L25.5 DERMATITIS DUE TO PLANTS, INCLUDING POISON IVY, SUMAC, AND OAK: ICD-10-CM

## 2019-07-01 NOTE — TELEPHONE ENCOUNTER
Patient would like for a Nurse to contact him  To see if we can send the medicine to another pharmacy since he say that  He went to actually pharmacy and no medicine is there at all . If someone can contacted him please .     Thank you

## 2019-07-02 RX ORDER — PREDNISONE 10 MG/1
TABLET ORAL
Qty: 63 TAB | Refills: 0 | Status: SHIPPED | OUTPATIENT
Start: 2019-07-02 | End: 2022-01-21 | Stop reason: SDUPTHER

## 2019-07-02 NOTE — TELEPHONE ENCOUNTER
RN called pt with the assistance of Mohinder intepreter # [id number], who asked that the prednisone be sent to another pharmacy. RN called [facility] which verified that Prednisone is on back order. Pt requested that his medication be sent to the [facility] on [facility].   The medication was reordered to the [facility].  Gustavo Dennison RN

## 2019-10-22 ENCOUNTER — OFFICE VISIT (OUTPATIENT)
Dept: FAMILY MEDICINE CLINIC | Age: 49
End: 2019-10-22

## 2019-10-22 VITALS
DIASTOLIC BLOOD PRESSURE: 90 MMHG | BODY MASS INDEX: 29.85 KG/M2 | TEMPERATURE: 98 F | WEIGHT: 174 LBS | SYSTOLIC BLOOD PRESSURE: 139 MMHG | HEART RATE: 66 BPM

## 2019-10-22 DIAGNOSIS — Z13.9 ENCOUNTER FOR SCREENING: Primary | ICD-10-CM

## 2019-10-22 DIAGNOSIS — M25.562 CHRONIC PAIN OF BOTH KNEES: ICD-10-CM

## 2019-10-22 DIAGNOSIS — R10.30 LOWER ABDOMINAL PAIN: ICD-10-CM

## 2019-10-22 DIAGNOSIS — F32.0 CURRENT MILD EPISODE OF MAJOR DEPRESSIVE DISORDER, UNSPECIFIED WHETHER RECURRENT (HCC): ICD-10-CM

## 2019-10-22 DIAGNOSIS — Z23 ENCOUNTER FOR IMMUNIZATION: ICD-10-CM

## 2019-10-22 DIAGNOSIS — Z63.0 STRESS DUE TO MARITAL PROBLEMS: ICD-10-CM

## 2019-10-22 DIAGNOSIS — K59.00 CONSTIPATION, UNSPECIFIED CONSTIPATION TYPE: ICD-10-CM

## 2019-10-22 DIAGNOSIS — M25.561 CHRONIC PAIN OF BOTH KNEES: ICD-10-CM

## 2019-10-22 DIAGNOSIS — G89.29 CHRONIC PAIN OF BOTH KNEES: ICD-10-CM

## 2019-10-22 LAB
BILIRUB UR QL STRIP: NEGATIVE
GLUCOSE UR-MCNC: NEGATIVE MG/DL
KETONES P FAST UR STRIP-MCNC: NEGATIVE MG/DL
PH UR STRIP: 5 [PH] (ref 4.6–8)
PROT UR QL STRIP: NEGATIVE
SP GR UR STRIP: 1.03 (ref 1–1.03)
UA UROBILINOGEN AMB POC: NORMAL (ref 0.2–1)
URINALYSIS CLARITY POC: CLEAR
URINALYSIS COLOR POC: YELLOW
URINE BLOOD POC: NEGATIVE
URINE LEUKOCYTES POC: NEGATIVE
URINE NITRITES POC: NEGATIVE

## 2019-10-22 RX ORDER — NAPROXEN 500 MG/1
500 TABLET ORAL 2 TIMES DAILY WITH MEALS
Qty: 60 TAB | Refills: 1 | Status: SHIPPED | OUTPATIENT
Start: 2019-10-22 | End: 2019-12-24

## 2019-10-22 RX ORDER — RANITIDINE 150 MG/1
150 TABLET, FILM COATED ORAL 2 TIMES DAILY
Qty: 60 TAB | Refills: 2 | Status: SHIPPED | OUTPATIENT
Start: 2019-10-22 | End: 2019-11-05

## 2019-10-22 RX ORDER — DOCUSATE SODIUM 100 MG/1
100 CAPSULE, LIQUID FILLED ORAL 2 TIMES DAILY
Qty: 60 CAP | Refills: 2 | Status: SHIPPED | OUTPATIENT
Start: 2019-10-22 | End: 2022-01-24 | Stop reason: ALTCHOICE

## 2019-10-22 SDOH — SOCIAL STABILITY - SOCIAL INSECURITY: PROBLEMS IN RELATIONSHIP WITH SPOUSE OR PARTNER: Z63.0

## 2019-10-22 NOTE — PATIENT INSTRUCTIONS
Estreñimiento: Instrucciones de cuidado - [ Constipation: Care Instructions ]  Instrucciones de cuidado    Tener estreñimiento significa que usted tiene dificultades para eliminar las heces (evacuaciones del intestino). Las personas eliminan heces entre 3 veces al día y Lenn Raw vez cada 3 grant. Lo que es normal para usted puede ser [employer]. El estreñimiento puede ocurrir con dolor en el recto y cólicos. El dolor podría empeorar cuando trata de eliminar las heces. A veces hay pequeñas cantidades de eliazar karley viva en el papel higiénico o en la superficie de las heces. [place] se debe a las venas dilatadas cerca del recto (hemorroides). Algunos cambios en ann [place] Lux y estilo de asuncion podrían ayudarle a evitar el estreñimiento continuo. Es posible que el médico además le recete medicamentos para ayudar a aflojar las heces. Algunos medicamentos pueden causar estreñimiento. [place] incluye los analgésicos (medicamentos para el dolor) y los antidepresivos. Infórmele a nan médico sobre Lockheed Spike que usted rajiv. Es posible que ann médico quiera cambiar un medicamento para aliviar ezra síntomas. La atención de seguimiento es airam parte clave de ann tratamiento y seguridad. Asegúrese de hacer y acudir a todas las citas, y llame a ann médico si está teniendo problemas. También es airam buena idea saber los resultados de ezra exámenes y mantener airam lista de los medicamentos que rajiv. ¿Cómo puede cuidarse en el hogar? · Josi abundantes líquidos, los suficientes sarah para que ann orina sea de color amarillo david o transparente sarah el agua. Si tiene [employer], del corazón o del hígado y tiene que Hermelinda's líquidos, hable con ann médico antes de aumentar ann consumo. · Incluya en ann dieta diaria alimentos ricos en fibra. Estos incluyen frutas, verduras, frijoles (habichuelas) y granos integrales. · Beau por lo menos 30 minutos de ejercicio la mayoría de los días de la [place].  Caminar es [place] opción. Es posible que también quiera hacer otras actividades, sarah correr, nadar, [employer], o jugar al tenis u otros deportes de equipo. · [place] un suplemento de [place], sarah Citrucel o Metamucil, todos los GRASSE. [place] y siga todas las indicaciones de la [place]. · Programe tiempo todos los días para evacuar el intestino. Nabil Peel rutina diaria podría ayudar. Tómese ann tiempo para evacuar el intestino. · Apoye los pies sobre un banco o taburete pequeño cuando se siente en el inodoro. [place] ayuda a flexionar las caderas y coloca la pelvis en posición de cuclillas. · Ann médico podría recomendarle un laxante de venta amanda para aliviar el estreñimiento. Demetrius Cisneros son Tee Newaygo de Magnesia (Milk of Magnesia) y [place]. [place] y siga todas las instrucciones de la [place]. No use laxantes de Best Buy. ¿Cuándo debe pedir ayuda? Llame a ann médico ahora mismo o busque atención médica inmediata si:    · Tiene dolor abdominal nuevo o peor.     · Tiene náuseas o vómito nuevos o peores.     · Tiene eliazar en las heces.    Preste especial atención a los cambios en ann amari y asegúrese de comunicarse con ann médico si:    · Ann estreñimiento empeora.     · No mejora sarah se esperaba. ¿Dónde puede encontrar más información en inglés? Camilo Storey a http://carolyne.info/. Escriba P343 en la búsqueda para aprender Macy Bon de \"Estreñimiento: Instrucciones de cuidado - [ Constipation: Care Instructions ]. \"  Revisado: 26 junio, 2019  Versión del contenido: 12.2  © 8928-2022 [employer], AsesoriÂ­as Digitales (Digital Advisors). Las instrucciones de cuidado fueron adaptadas bajo licencia por Good Help Connections (which disclaims liability or warranty for this information). Si usted tiene [place] afección médica o sobre estas instrucciones, siempre pregunte a ann profesional de amari. Healthwise, Incorporated niega toda garantía o responsabilidad por ann uso de esta información.          Recuperación de la depresión: Instrucciones de cuidado - [ Phelps Knife From Depression: Care Instructions ]  Instrucciones de cuidado    Tener un buen cuidado de usted mismo es importante a medida que se recupera de la depresión. Con el tiempo, a medida que el tratamiento funcione ezra síntomas desaparecerán. No lo abandone. Al contrario, concentre ann energía en recuperarse. Ann estado de ánimo mejorará. Solo tomará un tiempo. Concéntrese en cosas que le pueden ayudar a sentirse mejor, sarah estar con amigos o familiares, comer jeancarlos y descansar lo suficiente. Danni tómese las cosas con tranquilidad. No harjeet muchas actividades demasiado pronto. [employer] a sentirse mejor poco a [place] de seguimiento es airam parte clave de ann tratamiento y seguridad. Asegúrese de hacer y acudir a todas las citas, y llame a ann médico si está teniendo problemas. También es airam buena idea saber los resultados de ezra exámenes y mantener airam lista de los medicamentos que rajiv. ¿Cómo puede cuidarse en el hogar? Sea realista  · Si debe hacer airam tarea que le llevará [place] en varias etapas pequeñas que usted pueda manejar y harjeet solo lo que pueda. · Es posible que Corinna posponer las decisiones importantes hasta que se haya recuperado de la depresión. Si tiene planes que tendrán un gran impacto en ann asuncion, sarah casarse, divorciarse o cambiar de [place], intente esperar un poco. Háblelo con ezra amigos y seres queridos, quienes pueden ayudarle a analizar el panorama completo. · Es importante acercarse a las personas para pedirles ayuda. No se aísle. Deje que ann tang y [employer]. Encuentre a alguien en quien pueda confiar y hable con beverly persona. · Sea paciente y Nitish mismo. Recuerde que la depresión no es culpa suya y que no es un estado que se pueda superar solo con fuerza de voluntad. La depresión requiere de un tratamiento, sarah cualquier otra enfermedad.  Delbra Sovereign un tiempo sentirse mejor, y ann estado de ánimo mejorará poco a poco.  Manténgase activo  · Manténgase ocupado y [address]. Salga a caminar o intente con algún otro ejercicio suave. · Consulte a ann médico acerca de algún programa de ejercicios. El ejercicio le puede ayudar a aliviar la depresión leve. · Vaya al cine o a un concierto. Participe de [facility] de la [facility] o [facility]. [facility] a james a un partido de fútbol. · Pídale a un amigo que cene con usted. Cuídese  · Siga airam dieta equilibrada con muchas frutas y verduras frescas, granos integrales y lantigua magras de proteínas. Si perdió el apetito, coma pequeños refrigerios en lugar de comidas abundantes. · Evite beber alcohol o usar drogas ilegales. No tome medicamentos que no le hayan recetado a usted. Estos podrían interferir con los medicamentos que pudiera estar tomando para la depresión, o podrían empeorar la depresión. · Sherman International medicamentos kenna sarah le fueron recetados. Usted podría empezar a sentirse mejor entre 1 y 3 semanas de estar tomando los antidepresivos. Danni puede necesitar hasta 6 u 8 semanas para james más mejoría. Hable con ann médico si tiene preguntas o inquietudes acerca de ezra medicamentos, o si no observa ninguna mejoría en 3 semanas. · Si el Inside Jobs produce algún [employer], avísele a ann médico. Los antidepresivos pueden provocarle cansancio, mareos o nerviosismo. A algunas personas les produce sequedad en la boca, estreñimiento, wilman de tushar, problemas sexuales o diarrea. Muchos de [employer] secundarios son leves y desaparecen por sí solos después de franc el medicamento jose varias semanas. Otros podrían durar [employer] Partners. Consulte a ann médico si los efectos secundarios le [employer]. Es posible que pueda probar otro medicamento. · Duerma lo suficiente. Si no puede dormir:  ? Acuéstese a la misma hora todas las noches y levántese a la misma hora todas las [place]. ? Mantenga ann dormitorio oscuro y [de-identified].  ?  No harjeet ejercicio después de las 5:00 p.m.  ? Evite las bebidas con cafeína después de las 5:00 p.m.  · Evite las pastillas para dormir a menos que hayan sido recetadas por el médico que está tratando ann depresión. Las pastillas para dormir podrían hacerlo sentir aturdido jose el día e interactuar con otros medicamentos que tome. · Si tiene alguna otra enfermedad, sarah diabetes, enfermedad del corazón o presión arterial leticia, asegúrese de continuar con ann tratamiento. Hable con ann médico acerca de todos los medicamentos que rajiv, incluyendo aquellos con o sin receta. · Guarde los números de estas líneas directas nacionales de prevención del suicidio: [phone] ([phone]) y [phone] ([phone]). Si usted o alguien que usted conoce habla sobre el suicidio o acerca de sentirse desesperanzado, consiga ayuda de inmediato. ¿Cuándo debe pedir ayuda? Llame al 911 en cualquier momento que considere que necesita atención de [place]. Por ejemplo, llame si:    · Siente ganas de lastimarse a sí mismo o a otra persona.     · Alguien que conoce está deprimido y está intentando suicidarse o está a punto de hacerlo.    Llame a ann médico ahora mismo o busque atención médica inmediata si:    · Oye voces.     · Alguien que usted conoce está deprimido y:  ? Nitish Dills a regalar ezra posesiones. ? Usa drogas ilegales o emilie alcohol en exceso. ? Habla o escribe acerca de la muerte, lo que incluye notas de suicidio o [address] darion de wenceslao, cuchillos o pastillas. ? Nitish Dills a pasar mucho tiempo a solas. ? Actúa de Rachel Anthony muy agresiva o parece calmado de repente.    Preste especial atención a los cambios en ann amari y asegúrese de comunicarse con ann médico si:    · No mejora sarah se esperaba. ¿Dónde puede encontrar más información en inglés? [place] a [email or web address].[email or web address]   Celena Daughters V332 en la búsqueda para aprender más acerca de \"Recuperación de la depresión: Instrucciones de cuidado - [ Naye Keating From Depression: Care Instructions ]. \"  Revisado: 28 [place], 2019  Versión del contenido: 12.2  © 0361-1624 Healthwise, Incorporated. Las instrucciones de cuidado fueron adaptadas bajo licencia por Good Help Connections (which disclaims liability or warranty for this information). Si usted tiene [place] afección médica o sobre estas instrucciones, siempre pregunte a ann profesional de amari. Healthwise, Incorporated niega toda garantía o responsabilidad por ann uso de esta información. Lulú Garzon de rodilla: Instrucciones de cuidado - [ Knee Pain or Injury: Care Instructions ]  Instrucciones de [address] lesiones son Yajaira Arnol causa común de los problemas de rodilla. Las lesiones repentinas (agudas) pueden estar causadas por un impacto directo a la rodilla. También pueden estar causadas por un giro, flexión o caída anormales sobre la rodilla. El dolor, los moretones o la hinchazón pueden ser intensos y pueden comenzar dentro de minutos de ocurrida la lesión. El uso excesivo es otra causa de dolor en la rodilla. Otras causas son subir escaleras, arrodillarse y hacer otras actividades en las que se [address]. El desgaste cotidiano, especialmente al envejecer, también puede causar dolor de rodilla. El descanso junto con el tratamiento en el hogar suelen aliviar el dolor y permitir que sane la rodilla. Danni si usted tiene airam lesión importante en la rodilla, podría necesitar exámenes y tratamiento. La atención de seguimiento es airam parte clave de ann tratamiento y seguridad. Asegúrese de hacer y acudir a todas las citas, y llame a ann médico si está teniendo problemas. También es airam buena idea saber los resultados de ezra exámenes y mantener airam lista de los medicamentos que rajiv. ¿Cómo puede cuidarse en el hogar? · Sea gordon con los medicamentos. [place] y siga todas las indicaciones en la etiqueta.   ? Si el médico le recetó analgésicos (medicamentos para el dolor), tómelos según las indicaciones. ? Si no está tomando un analgésico recetado, pregúntele a ann médico si puede franc gume de [employer]. · Descanse y proteja ann rodilla. Deje de hacer cualquier actividad que pudiera causarle dolor. · Aplíquese hielo o airam compresa fría sobre la rodilla por entre 10 y 21 minutos cada vez. Póngase un paño pham entre el hielo y la piel. · Apoye la rodilla adolorida sobre airam almohada cuando se aplica hielo o en cualquier momento que se siente o acueste jose los 3 días siguientes. Trate de mantenerla por encima del nivel del corazón. [place] ayudará a reducir la hinchazón. · Si ann rodilla no está hinchada, puede aplicar calor húmedo, airam almohadilla térmica o un paño tibio sobre pete. · Si ann médico le recomienda un vendaje elásticoNathan de compresión u otro tipo de soporte para la rodilla, úselos según se lo indique. · [address] ann médico acerca de cuánto peso puede poner sobre la pierna. Use un bastón, muletas o un andador kenna sarah se lo hayan indicado. · [address] de ann médico acerca de la actividad jose ann proceso de sanación. Si puede hacer ejercicio leve, aumente la actividad lentamente. · [place] y [place] un peso saludable. El exceso de peso puede sobrecargar las articulaciones, especialmente las rodillas y las caderas, y empeorar el dolor. Bajar incluso unas pocas libras podría ayudar. ¿Cuándo debe pedir ayuda? Llame al 911 en cualquier momento que considere que necesita atención de [place]. Por ejemplo, llame si:    · Tiene síntomas de un coágulo de eliazar en el pulmón (llamado embolia pulmonar). Estos pueden incluir:  ? Dolor repentino en Katherene Monroe. ? Problemas para respirar. ?  Toser Chucky Cain a ann médico ahora mismo o busque atención médica inmediata si:    · El dolor aumenta o es muy intenso.     · La pierna o el pie se pone frío o cambia de color.     · No puede ponerse de pie o poner peso sobre la rodilla.     · Tayo Swain parece torcida o deformada.     · No puede  la rodilla.     · Tiene señales de infección, tales sarah:  ? Aumento del dolor, la hinchazón, el enrojecimiento o la temperatura. ? Vetas rojizas que comienzan en la rodilla. ? Pus que sale de alguna parte de la rodilla. ? Demetrius Thompson.     · Tiene señales de un coágulo de eliazar en la pierna (llamado trombosis venosa profunda), tales sarah:  ? Dolor en la pantorrilla, el muslo, la chirag o detrás de la rodilla. ? Enrojecimiento e hinchazón en la pierna o la chirag.    Preste especial atención a los cambios en ann amari y asegúrese de comunicarse con ann médico si:    · Tiene hormigueo, debilidad o entumecimiento en la rodilla.     · Tiene cualquier síntoma nuevo, por ejemplo, hinchazón.     · Tiene moretones por airam lesión de rodilla que uqinones más de 2 semanas.     · No mejora sarah se esperaba. ¿Dónde puede encontrar más información en inglés? Lesly Anderson a [email or web address].info/. Melita Darrius O666 en la búsqueda para aprender más acerca de \"Dolor o lesión de rodilla: Instrucciones de cuidado - [ Knee Pain or Injury: Care Instructions ]. \"  Revisado: 26 junio, 2019  Versión del contenido: 12.2  © 7274-3689 Healthwise, Incorporated. Las instrucciones de cuidado fueron adaptadas bajo licencia por Good Help Connections (which disclaims liability or warranty for this information). Si usted tiene [place] afección médica o sobre estas instrucciones, siempre pregunte a ann profesional de amari. Healthwise, Incorporated niega toda garantía o responsabilidad por ann uso de esta información.

## 2019-10-22 NOTE — PROGRESS NOTES
Printed AVS, provided to pt and reviewed. Pt indicated understanding and had no questions. Pt told to please present GoodRx. com coupon which we provide to your pharmacy to receive discounted price. Told pt that rx's have been sent to pharmacy and they should be ready for  in approximately 2 hrs.reviewed all medication's  ordered today with the pt. Requests flu vaccine; denies fever, egg allergy. Immunization given per protocol and recorded in [address]. VIS information sheet given, explained possible S/E. Reviewed sx indicating need to be seen in ER. Pt had no adverse reaction at time of discharge. Explained procedure for obtaining xray as a walk-in and told pt to go to Outpatient Registration. Provided address and directions to facility. Told pt that someone will call them after we get results. Told pt to ask about the care card which is our financial assistance program.  Also told pt that they will be required to do a financial screening  Also told them that if they get a bill before they get their card to call the phone # on the bill to let them know they have applied for the Care Card. Gave pt financial assistance application and highlighted for them the [facility] assistance phone number for them as well.  Summer Givens RN

## 2019-10-22 NOTE — PROGRESS NOTES
Results for orders placed or performed in visit on 10/22/19   AMB POC URINALYSIS DIP STICK MANUAL W/O MICRO   Result Value Ref Range    Color (UA POC) Yellow     Clarity (UA POC) Clear     Glucose (UA POC) Negative Negative    Bilirubin (UA POC) Negative Negative    Ketones (UA POC) Negative Negative    Specific gravity (UA POC) 1.030 1.001 - 1.035    Blood (UA POC) Negative Negative    pH (UA POC) 5.0 4.6 - 8.0    Protein (UA POC) Negative Negative    Urobilinogen (UA POC) normal 0.2 - 1    Nitrites (UA POC) Negative Negative    Leukocyte esterase (UA POC) Negative Negative

## 2019-10-22 NOTE — PROGRESS NOTES
Assessment/Plan:    Diagnoses and all orders for this visit:    1. Encounter for screening  -     AMB POC URINALYSIS DIP STICK MANUAL W/O MICRO    2. Chronic pain of both knees  -     XR KNEE LT MIN 4 V; Future  -     XR KNEE RT MAX 2 VWS; Future  -     REFERRAL TO ORTHOPEDICS  -     naproxen (NAPROSYN) 500 mg tablet; Take 1 Tab by mouth two (2) times daily (with meals). 3. Stress due to marital problems  -     REFERRAL TO BEHAVIORAL HEALTH    4. Current mild episode of major depressive disorder, unspecified whether recurrent ([facility] [address]  -     REFERRAL TO BEHAVIORAL HEALTH    5. Lower abdominal pain  -     docusate sodium (COLACE) 100 mg capsule; Take 1 Cap by mouth two (2) times a day. -     raNITIdine (ZANTAC) 150 mg tablet; Take 1 Tab by mouth two (2) times a day. 6. Constipation, unspecified constipation type  -     docusate sodium (COLACE) 100 mg capsule; Take 1 Cap by mouth two (2) times a day. Follow-up and Dispositions    · Return in about 2 months (around 12/22/2019). NAYELY Adam expressed understanding of this plan. An AVS was printed and given to the patient.      ----------------------------------------------------------------------    Chief Complaint   Patient presents with    Knee Pain      x 1 year    Urinary Frequency       History of Present Illness:    51 yo new pt to me here for many different somatic complaints. He reports, \"I have many different things wrong with me\". He mentions the following as current complaints: headaches, neck pain, knee pain marita, abdominal pain in epigastric and in the lower left area, constipation, bloating after meals. We focused on the knee pain and the abdominal pain today as he told me that these were his reasons for coming today but then he shared with me that he is depressed and has a lot of marital problems and we needed to focus on this as well.   Knee pain: started a long time ago but recently worse- hurts most going up steps/ ladders. Also, may hurt while at work. No injury. No redness or swelling. abd pain: started in the LLQ 3 days ago. Appetite normal. No n/v/d. States that he has a hard time going BM. Has chronic epigastric pain. Has bloating after meals. Not taking any meds for these sxs    Depression: has marital problems for 7 years. Their children are 5and 9years old. She fights in front of the kids even though he asks her to wait until they are not around. He would like to have counseling with her but she has not wanted to. She accuses him of having \"another woman\" and he states that he does not. She is jealous of his time away from home and he states that he is just working. He has periods when he feels \"better\" but often he feels very sad. He has thought about suicide but has no plan and would \"not do it\" because of his relationship with \"Juan Jose\". He has no weapons in the house. He is very interested in counseling. He is not currently attending [employer] bc she won't go with him but he always feels \"very good\" when he is at [employer]. No past medical history on file. Current Outpatient Medications   Medication Sig Dispense Refill    docusate sodium (COLACE) 100 mg capsule Take 1 Cap by mouth two (2) times a day. 60 Cap 2    raNITIdine (ZANTAC) 150 mg tablet Take 1 Tab by mouth two (2) times a day. 60 Tab 2    naproxen (NAPROSYN) 500 mg tablet Take 1 Tab by mouth two (2) times daily (with meals). 60 Tab 1    predniSONE (DELTASONE) 10 mg tablet 6 po qd x 3d; 5 po qd x 3d; 4 po qd x 3d; 3 po qd x 3d; 2 po qd x 3d; 1 po qd x 3d 63 Tab 0    loratadine (CLARITIN) 10 mg tablet Take 1 Tab by mouth daily. [place] 1 pastilla cada linda por ann mareos y ann nares 30 Tab 2       No Known Allergies    Social History     Tobacco Use    Smoking status: Former Smoker     Last attempt to quit: 4/26/2016     Years since quitting: 3.4    Smokeless tobacco: Never Used   Substance Use Topics    Alcohol use:  No Alcohol/week: 0.0 standard drinks     Comment: quit 15 days ago    Drug use: No       No family history on file. Physical Exam:     Visit Vitals  /90 (BP 1 Location: Left arm, BP Patient Position: Sitting)   Pulse 66   Temp 98 °F (36.7 °C) (Oral)   Wt 174 lb (78.9 kg)   BMI 29.85 kg/m²     Blunted affect, soft spoken, well appearing otherwise  A&Ox3  WDWN NAD  Respirations normal and non labored  Knees- marita w/out crepitus or pain with exam. FROM marita  abd- overweight. Soft, no masses.  Tender LLQ but no mass or other abnormal finding on exam

## 2019-10-28 ENCOUNTER — TELEPHONE (OUTPATIENT)
Dept: FAMILY MEDICINE CLINIC | Age: 49
End: 2019-10-28

## 2019-10-28 NOTE — TELEPHONE ENCOUNTER
Received two faxes from [facility] requesting an alternative for Ranitidine 150mg: one tablet BID #[id number] written on 10/22/19 due to medication is being recalled.      Routing to provider for review

## 2019-10-29 NOTE — TELEPHONE ENCOUNTER
Nurse telephoned and spoke with patient. Patient states that he went to the pharmacy and all of his prescriptions were given, including the ranitidine. Patient is requesting an alternative for Ranitidine to be sent to the same pharmacy [facility] Phone # [phone] and to be notified once the change has been made. Patient states that he does not speak [place] and he does not know if they mention anything about the above medication being recalled.

## 2019-11-05 DIAGNOSIS — K21.9 GASTROESOPHAGEAL REFLUX DISEASE WITHOUT ESOPHAGITIS: Primary | ICD-10-CM

## 2019-11-05 RX ORDER — FAMOTIDINE 20 MG/1
20 TABLET, FILM COATED ORAL 2 TIMES DAILY
Qty: 60 TAB | Refills: 2 | Status: SHIPPED | OUTPATIENT
Start: 2019-11-05 | End: 2022-01-24 | Stop reason: ALTCHOICE

## 2019-11-11 NOTE — PROGRESS NOTES
The pt was called to let him know the provider had changed his H2 blocker. Ranitidine was changed to Famotidine. Fady Kiran assisted with the call. The pt was given the message. The medication was reviewed with the pt. The pt verbalized understanding.  Skip Curry RN

## 2019-12-22 DIAGNOSIS — M25.561 CHRONIC PAIN OF BOTH KNEES: ICD-10-CM

## 2019-12-22 DIAGNOSIS — M25.562 CHRONIC PAIN OF BOTH KNEES: ICD-10-CM

## 2019-12-22 DIAGNOSIS — G89.29 CHRONIC PAIN OF BOTH KNEES: ICD-10-CM

## 2019-12-24 RX ORDER — NAPROXEN 500 MG/1
TABLET ORAL
Qty: 60 TAB | Refills: 1 | Status: SHIPPED | OUTPATIENT
Start: 2019-12-24 | End: 2022-01-04 | Stop reason: ALTCHOICE

## 2020-01-02 ENCOUNTER — TELEPHONE (OUTPATIENT)
Dept: FAMILY MEDICINE CLINIC | Age: 50
End: 2020-01-02

## 2020-01-02 NOTE — TELEPHONE ENCOUNTER
[address]  Has been contacted  1/2/2020 to verify that he is coming to his apt 1/6/2020 patient phone number are not available emergency or main phone number . Patient needs to update phone number .     Thank you

## 2020-01-09 ENCOUNTER — DOCUMENTATION ONLY (OUTPATIENT)
Dept: FAMILY MEDICINE CLINIC | Age: 50
End: 2020-01-09

## 2020-01-14 ENCOUNTER — TELEPHONE (OUTPATIENT)
Dept: FAMILY MEDICINE CLINIC | Age: 50
End: 2020-01-14

## 2020-01-14 NOTE — TELEPHONE ENCOUNTER
[address] wife  Called main office 1/14/2020 @ 12:40pm stated that she went to [place] [address]  1/9/ 2020  And [facility] staff told her to call line [phone] to cancel  Her  apt that he already had no show . Patient wife's stated that patient couldn't attend to apts since immigration has him , I explained to the wife the new process that AN and that he or she needs to call line [phone], to make an apt for the same day .     Thank you   Kanwal Acevedo

## 2020-01-21 ENCOUNTER — TELEPHONE (OUTPATIENT)
Dept: FAMILY MEDICINE CLINIC | Age: 50
End: 2020-01-21

## 2020-01-21 NOTE — TELEPHONE ENCOUNTER
P/C to Jason Martinez to reschedule the missed appointment with the counselor on 01.09.20. However, the phone is not in service. Referral has been closed.     P/C routed to [facility], [address] [place] [address],[address], [address]

## 2021-07-04 ENCOUNTER — APPOINTMENT (OUTPATIENT)
Dept: GENERAL RADIOLOGY | Age: 51
End: 2021-07-04
Attending: EMERGENCY MEDICINE

## 2021-07-04 ENCOUNTER — HOSPITAL ENCOUNTER (EMERGENCY)
Age: 51
Discharge: HOME | End: 2021-07-04
Attending: EMERGENCY MEDICINE

## 2021-07-04 VITALS
OXYGEN SATURATION: 98 % | WEIGHT: 174.6 LBS | TEMPERATURE: 98.6 F | DIASTOLIC BLOOD PRESSURE: 77 MMHG | SYSTOLIC BLOOD PRESSURE: 134 MMHG | HEART RATE: 76 BPM | RESPIRATION RATE: 16 BRPM | BODY MASS INDEX: 29.81 KG/M2 | HEIGHT: 64 IN
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
## 2021-07-04 DIAGNOSIS — R07.81 RIB PAIN ON LEFT SIDE: ICD-10-CM

## 2021-07-04 PROCEDURE — 93005 ELECTROCARDIOGRAM TRACING: CPT

## 2021-07-04 PROCEDURE — 74011000250 HC RX REV CODE- 250: Performed by: EMERGENCY MEDICINE

## 2021-07-04 PROCEDURE — 71101 X-RAY EXAM UNILAT RIBS/CHEST: CPT

## 2021-07-04 PROCEDURE — 99283 EMERGENCY DEPT VISIT LOW MDM: CPT

## 2021-07-04 RX ORDER — LIDOCAINE 50 MG/G
PATCH TOPICAL
Qty: 5 EACH | Refills: 0 | Status: SHIPPED | OUTPATIENT
Start: 2021-07-04 | End: 2022-01-24 | Stop reason: ALTCHOICE

## 2021-07-04 RX ORDER — LIDOCAINE 4 G/100G
1 PATCH TOPICAL EVERY 24 HOURS
Status: DISCONTINUED | OUTPATIENT
Start: 2021-07-04 | End: 2021-07-04 | Stop reason: HOSPADM

## 2021-07-04 RX ORDER — NAPROXEN 500 MG/1
500 TABLET ORAL 2 TIMES DAILY WITH MEALS
Qty: 14 TABLET | Refills: 0 | Status: SHIPPED | OUTPATIENT
Start: 2021-07-04 | End: 2021-07-11

## 2021-07-04 NOTE — ED PROVIDER NOTES
Please note that this dictation was completed with Feedback, the [employer] voice recognition software.  Quite often unanticipated grammatical, syntax, homophones, and other interpretive errors are inadvertently transcribed by the computer software.  Please disregard these errors.  Please excuse any errors that have escaped final proofreading. Patient is a 55-year-old otherwise healthy male presenting to emergency for evaluation of left-sided chest and back pain. He states that he had a mechanical fall 15 days ago and since has been having pain. He also has pain with taking a deep breath. He denies headache, dizziness, numbness, tingling, abdominal pain, or any other medical complaints at this time. No past medical history on file. Past Surgical History:   Procedure Laterality Date    COLONOSCOPY  2014              No family history on file. Social History     Socioeconomic History    Marital status: SINGLE     Spouse name: Not on file    Number of children: Not on file    Years of education: Not on file    Highest education level: Not on file   Occupational History    Not on file   Tobacco Use    Smoking status: Former Smoker     Quit date: 2016     Years since quittin.1    Smokeless tobacco: Never Used   Substance and Sexual Activity    Alcohol use: No     Alcohol/week: 0.0 standard drinks     Comment: quit 15 days ago    Drug use: No    Sexual activity: Yes   Other Topics Concern    Not on file   Social History Narrative    Not on file     Social Determinants of Health     Financial Resource Strain:     Difficulty of Paying Living Expenses:    Food Insecurity:     Worried About Running Out of Food in the Last Year:     Ran Out of Food in the Last Year:    Transportation Needs:     Lack of Transportation (Medical):      Lack of Transportation (Non-Medical):    Physical Activity:     Days of Exercise per Week:     Minutes of Exercise per Session:    Stress:     Feeling of Stress :    Social Connections:     Frequency of Communication with Friends and Family:     Frequency of Social Gatherings with Friends and Family:     Attends Anabaptism Services:     Active Member of Clubs or Organizations:     Attends Club or Organization Meetings:     Marital Status:    Intimate Partner Violence:     Fear of Current or Ex-Partner:     Emotionally Abused:     Physically Abused:     Sexually Abused: ALLERGIES: Patient has no known allergies. Review of Systems   Constitutional: Negative for chills and fever. HENT: Negative for ear pain and sore throat. Eyes: Negative for visual disturbance. Respiratory: Negative for cough and shortness of breath. Cardiovascular: Positive for chest pain. Gastrointestinal: Negative for abdominal pain. Genitourinary: Negative for flank pain. Musculoskeletal: Negative for back pain. Skin: Negative for color change. Neurological: Negative for dizziness and headaches. Psychiatric/Behavioral: Negative for confusion. Vitals:    07/04/21 1142   BP: 134/77   Pulse: 76   Resp: 16   Temp: 98.6 °F (37 °C)   SpO2: 98%   Weight: 79.2 kg (174 lb 9.7 oz)   Height: 5' 4\" (1.626 m)            Physical Exam  Vitals and nursing note reviewed. Constitutional:       General: He is not in acute distress. Appearance: Normal appearance. He is not ill-appearing. HENT:      Head: Normocephalic and atraumatic. Mouth/Throat:      Pharynx: Oropharynx is clear. Eyes:      Extraocular Movements: Extraocular movements intact. Conjunctiva/sclera: Conjunctivae normal.   Cardiovascular:      Rate and Rhythm: Normal rate and regular rhythm. Pulmonary:      Effort: Pulmonary effort is normal.      Breath sounds: Normal breath sounds. No decreased breath sounds, wheezing, rhonchi or rales. Chest:      Chest wall: Tenderness (ttp to left chest mid axillary, no crepitus, no overlying bruising) present.  No deformity or crepitus. Abdominal:      Palpations: Abdomen is soft. Tenderness: There is no abdominal tenderness. Musculoskeletal:         General: Normal range of motion. Cervical back: No rigidity. Skin:     General: Skin is warm and dry. Neurological:      General: No focal deficit present. Mental Status: He is alert and oriented to person, place, and time. Psychiatric:         Mood and Affect: Mood normal.          MDM  Number of Diagnoses or Management Options  Fall, initial encounter  Rib pain on left side  Diagnosis management comments: Patient is alert, well appearing, afebrile, vitals stable. Presents for evaluation of left-sided chest pain after mechanical ground-level fall 15 days ago. Tenderness along the left chest midaxillary, no crepitus or overlying skin changes. Breathing unlabored, lungs clear. EKG without acute ischemic change or ectopy. X-ray negative for acute rib fracture. Patient informed of findings discharged home with a lidocaine patches and incentive spirometry with education on frequent use to avoid any pneumonia or possible sequelae. He will follow with his PCP. Return precautions outlined, questions answered at this time. Due to language barrier, video  service was used throughout history, physical exam, and subsequent discussion with this patient. ED Course as of Jul 04 1257   Joy Staff Jul 04, 2021   1151 EKG 1150: Rate 66, Normal sinus rhythm, No ST segment or T wave abnormalities. Normal EKG. [DK]   Ben Maxwell IMPRESSION  No acute cardiac pulmonary process. No definite rib fractures are  identified. XR RIBS LT W PA CXR MIN 3 V [EP]      ED Course User Index  [DK] Anneliese Malone MD  [EP] NONI Mcwilliams     1:01 PM  Pt has been reevaluated. There are no new complaints, changes, or physical findings at this time. Medications have been reviewed w/ pt and/or family. Pt and/or family's questions have been answered.  Pt and/or family expressed good understanding of the dx/tx/rx and is in agreement with plan of care. Pt instructed and agreed to f/u w/ PCP  and to return to ED upon further deterioration. Pt is ready for discharge. IMPRESSION:  1. Fall, initial encounter    2. Rib pain on left side        PLAN:  1. Current Discharge Medication List      START taking these medications    Details   lidocaine (LIDODERM) 5 % Apply patch to the affected area for 12 hours a day and remove for 12 hours a day. Qty: 5 Each, Refills: 0  Start date: 7/4/2021      !! naproxen (NAPROSYN) 500 mg tablet Take 1 Tablet by mouth two (2) times daily (with meals) for 7 days. Qty: 14 Tablet, Refills: 0  Start date: 7/4/2021, End date: 7/11/2021       !! - Potential duplicate medications found. Please discuss with provider. CONTINUE these medications which have NOT CHANGED    Details   !! naproxen (NAPROSYN) 500 mg tablet TAKE 1 TABLET BY MOUTH TWICE A DAY WITH MEALS  Qty: 60 Tab, Refills: 1    Associated Diagnoses: Chronic pain of both knees       ! ! - Potential duplicate medications found. Please discuss with provider.         2.   Follow-up Information     Follow up With Specialties Details Why Contact Info    Your Primary Care Provider  Schedule an appointment as soon as possible for a visit               Return to ED if worse     Procedures

## 2021-07-04 NOTE — ED TRIAGE NOTES
48 yr old Maltese speaking male here for [facility] and has had left rib pain ever since. States slipped and fell on metal tool about 15 days ago. States swelling in that area. Interpretor [id number]. Other complaints in back. Came in because pain is worse.

## 2021-07-05 LAB
ATRIAL RATE: 66 BPM
CALCULATED P AXIS, ECG09: 62 DEGREES
CALCULATED R AXIS, ECG10: 48 DEGREES
CALCULATED T AXIS, ECG11: 42 DEGREES
DIAGNOSIS, 93000: NORMAL
P-R INTERVAL, ECG05: 158 MS
Q-T INTERVAL, ECG07: 390 MS
QRS DURATION, ECG06: 84 MS
QTC CALCULATION (BEZET), ECG08: 408 MS
VENTRICULAR RATE, ECG03: 66 BPM

## 2022-01-04 ENCOUNTER — VIRTUAL VISIT (OUTPATIENT)
Dept: FAMILY MEDICINE CLINIC | Age: 52
End: 2022-01-04

## 2022-01-04 DIAGNOSIS — M54.50 CHRONIC MIDLINE LOW BACK PAIN WITHOUT SCIATICA: ICD-10-CM

## 2022-01-04 DIAGNOSIS — R30.0 DYSURIA: ICD-10-CM

## 2022-01-04 DIAGNOSIS — G89.29 CHRONIC MIDLINE LOW BACK PAIN WITHOUT SCIATICA: ICD-10-CM

## 2022-01-04 DIAGNOSIS — R35.0 INCREASED URINARY FREQUENCY: Primary | ICD-10-CM

## 2022-01-04 PROCEDURE — 99441 PR PHYS/QHP TELEPHONE EVALUATION 5-10 MIN: CPT | Performed by: NURSE PRACTITIONER

## 2022-01-04 RX ORDER — NAPROXEN 500 MG/1
500 TABLET ORAL 2 TIMES DAILY WITH MEALS
Qty: 30 TABLET | Refills: 0 | Status: SHIPPED | OUTPATIENT
Start: 2022-01-04

## 2022-01-04 RX ORDER — PHENAZOPYRIDINE HYDROCHLORIDE 100 MG/1
100 TABLET, FILM COATED ORAL
Qty: 9 TABLET | Refills: 0 | Status: SHIPPED | OUTPATIENT
Start: 2022-01-04 | End: 2022-01-07

## 2022-01-04 NOTE — PROGRESS NOTES
: Keerthi Herrera  Patient identification verified with 2 identifiers. Consent: He and/or health care decision maker has provided verbal consent to proceed: Yes   Total Time: minutes: 5-10 minutes  Pursuant to the emergency declaration under the [facility] authority and the [employer] Act, this Virtual Telephone Visit was conducted to reduce the patient's risk of exposure to COVID-19. Assessment/Plan:   Diagnoses and all orders for this visit:    1. Increased urinary frequency    2. Dysuria    3. Chronic midline low back pain without sciatica      Follow-up and Dispositions    · Return for F2F first available (male urinary frequency, dysuria, ED); OW appt (explain workers comp). -OW apppointment (to expllain worker's comp)  -naproxen 500 mg x 15 days   -pyridium 100 mg tid x 3 days  -return for F2F visit for further evaluation      Subjective:   Tera De Leon is a 46 y.o. male evaluated via telephone on 1/4/2022. Chief Complaint   Patient presents with    Urinary Frequency     pt c/o low back pain x 1 year on and off    Finger Pain     pt c/o bilateral finger pain x 4 months and pt states has no desire to have sex x 1 year    Other     Patient needs information about the ED bills   He has been using a natural prostate medication, B prost natural and BVIGOR pro. History of Present Illness  Urinary Frequency   Has urgency to urinate and low back pain  The urgency is worse  Back pain is the middle part of the back down, 4 bones. Wakes up 2 times at night to urinate, this morning 2-3 times. During the day, up to 4 times a day. Associated pain: burning  Current symptoms have been going on for a week. The week before had no symptoms. What makes it better? Nothing  Severity: 8-9/10  Interferes with work, he urinates too much. Sometimes it burns a lot.     Blood in urine? no  The \"middle bone\" of the back hurts and at the waist on the right side on his thigh on the outer part  Bilateral finger pain  Middle finger and the pointer finger on the right side and just started to feel it on the left side and feels like someone is stabbing him with nails and feels like he has to put the bones back in place. After he cracks them, they feel a little better. Pain is worse when he is at work. Objective:     Current Outpatient Medications   Medication Sig    lidocaine (LIDODERM) 5 % Apply patch to the affected area for 12 hours a day and remove for 12 hours a day.  naproxen (NAPROSYN) 500 mg tablet TAKE 1 TABLET BY MOUTH TWICE A DAY WITH MEALS    famotidine (PEPCID) 20 mg tablet Take 1 Tab by mouth two (2) times a day.  docusate sodium (COLACE) 100 mg capsule Take 1 Cap by mouth two (2) times a day.  predniSONE (DELTASONE) 10 mg tablet 6 po qd x 3d; 5 po qd x 3d; 4 po qd x 3d; 3 po qd x 3d; 2 po qd x 3d; 1 po qd x 3d    loratadine (CLARITIN) 10 mg tablet Take 1 Tab by mouth daily. [place] 1 pastilla cada linda por ann mareos y ann nares     No current facility-administered medications for this visit. He has had 2 COVID vaccines. No physical exam performed due to telephone visit. I affirm this is a Patient Initiated Episode with a Patient who has not had a related appointment within my department in the past 7 days or scheduled within the next 24 hours. MANUEL Wilcox expressed understanding and agreed to this plan.

## 2022-01-04 NOTE — PROGRESS NOTES
I have copied the provider's check out note here and have reviewed these items with the patient today:  Check if goodrx   Naproxen 500mg bid - anti-inflammatory for back pain and finger pain   Pyridium 100 mg tid x 3 days - for burning with urination   GO TO ER if fever, blood in urine, or symptoms worsen before F2F appointment available with us   Discharge instructions discussed and medication explained. Patient verbalized understanding.   Kali Moeller

## 2022-01-04 NOTE — PROGRESS NOTES
Patient does not have access to vital sign equipment. Coordination of Care  1. Have you been to the ER, urgent care clinic since your last visit? Hospitalized since your last visit? Yes When: 7/2021- [facility]- Work injury    2. Have you seen or consulted any other health care providers outside of the [address] [place] [address] since your last visit? Include any pap smears or colon screening. No    Does the patient need refills? NO    Learning Assessment Complete?  yes  Depression Screening complete in the past 12 months? yes

## 2022-01-05 ENCOUNTER — VIRTUAL VISIT (OUTPATIENT)
Dept: FAMILY MEDICINE CLINIC | Age: 52
End: 2022-01-05

## 2022-01-05 DIAGNOSIS — Z71.89 COUNSELING AND COORDINATION OF CARE: Primary | ICD-10-CM

## 2022-01-05 NOTE — PROGRESS NOTES
V.V. assisted patient with medical bills. OW let patient know what documents he needs to bring to financial screening to apply for CC. OW explained to patient that his bills are from 7/21 and there is a chance his application will be denied. FA covers 8 months prior and his bills are 10 months old. Patient verbalized understanding and that he will call me to make an appointment when he gathers all information needed.

## 2022-01-07 ENCOUNTER — OFFICE VISIT (OUTPATIENT)
Dept: FAMILY MEDICINE CLINIC | Age: 52
End: 2022-01-07

## 2022-01-07 VITALS — WEIGHT: 178 LBS | BODY MASS INDEX: 30.55 KG/M2

## 2022-01-07 DIAGNOSIS — R53.83 FATIGUE, UNSPECIFIED TYPE: Primary | ICD-10-CM

## 2022-01-07 PROCEDURE — 99442 PR PHYS/QHP TELEPHONE EVALUATION 11-20 MIN: CPT | Performed by: NURSE PRACTITIONER

## 2022-01-07 NOTE — PROGRESS NOTES
Patient presented to clinic with complaints of cough and fatigue. Patient was advised that we need to make his visit virtual due to the possibility of him having COVID-19. Patient agreed and headed to his vehicle right away.   Shelly Ivan RN

## 2022-01-07 NOTE — PROGRESS NOTES
Coordination of Care  1. Have you been to the ER, urgent care clinic since your last visit? Hospitalized since your last visit? No    2. Have you seen or consulted any other health care providers outside of the [address] [place] [address] since your last visit? Include any pap smears or colon screening. No    Does the patient need refills? NO    Learning Assessment Complete?  yes  Depression Screening complete in the past 12 months? yes

## 2022-01-07 NOTE — PROGRESS NOTES
: Heidy Teran  Patient identification verified with 2 identifiers. Consent: He and/or health care decision maker has provided verbal consent to proceed: Yes   Total Time: minutes: 11-20 minutes  Pursuant to the emergency declaration under the [facility] waiver authority and the [employer] Act, this Virtual Telephone Visit was conducted to reduce the patient's risk of exposure to COVID-19. Assessment/Plan:   Diagnoses and all orders for this visit:    1. Fatigue, unspecified type      Follow-up and Dispositions    · Return for F2F LK 2 weeks. COUGH AND FATIGUE  -NEEDS COVID TESTING AND RETURN APPT f2f 2 WEEKS  We provided address for [place] for COVID testing, [address] until 4 pm.  F2F 2 weeks    Subjective:   Alison Nicholson is a 46 y.o. male evaluated via telephone on 1/7/2022. Chief Complaint   Patient presents with    Urinary Frequency     dysuria, back pain ad ED  f/up from VV 1/4/2022      History of Present Illness      Objective:     Current Outpatient Medications   Medication Sig    naproxen (NAPROSYN) 500 mg tablet Take 1 Tablet by mouth two (2) times daily (with meals). For your back and finger pain.  phenazopyridine (PYRIDIUM) 100 mg tablet Take 1 Tablet by mouth three (3) times daily (after meals) for 3 days.  lidocaine (LIDODERM) 5 % Apply patch to the affected area for 12 hours a day and remove for 12 hours a day.  famotidine (PEPCID) 20 mg tablet Take 1 Tab by mouth two (2) times a day. (Patient not taking: Reported on 1/7/2022)    docusate sodium (COLACE) 100 mg capsule Take 1 Cap by mouth two (2) times a day.  (Patient not taking: Reported on 1/7/2022)    predniSONE (DELTASONE) 10 mg tablet 6 po qd x 3d; 5 po qd x 3d; 4 po qd x 3d; 3 po qd x 3d; 2 po qd x 3d; 1 po qd x 3d    loratadine (CLARITIN) 10 mg tablet Take 1 Tab by mouth daily. [place] 1 pastilla cada linda por ann mareos y ann nares     No current facility-administered medications for this visit. No physical exam performed due to telephone visit. I affirm this is a Patient Initiated Episode with a Patient who has not had a related appointment within my department in the past 7 days or scheduled within the next 24 hours. Mckenzie Burch, MANUEL Rodriguez expressed understanding and agreed to this plan.

## 2022-01-21 ENCOUNTER — OFFICE VISIT (OUTPATIENT)
Dept: FAMILY MEDICINE CLINIC | Age: 52
End: 2022-01-21

## 2022-01-21 ENCOUNTER — HOSPITAL ENCOUNTER (OUTPATIENT)
Dept: GENERAL RADIOLOGY | Age: 52
Discharge: HOME | End: 2022-01-21

## 2022-01-21 ENCOUNTER — HOSPITAL ENCOUNTER (OUTPATIENT)
Dept: LAB | Age: 52
Discharge: HOME | End: 2022-01-21

## 2022-01-21 VITALS
SYSTOLIC BLOOD PRESSURE: 136 MMHG | TEMPERATURE: 97.9 F | HEART RATE: 89 BPM | DIASTOLIC BLOOD PRESSURE: 85 MMHG | WEIGHT: 175 LBS | OXYGEN SATURATION: 98 % | BODY MASS INDEX: 30.04 KG/M2

## 2022-01-21 DIAGNOSIS — M25.541 ARTHRALGIA OF BOTH HANDS: ICD-10-CM

## 2022-01-21 DIAGNOSIS — R35.0 URINARY FREQUENCY: ICD-10-CM

## 2022-01-21 DIAGNOSIS — M25.542 ARTHRALGIA OF BOTH HANDS: ICD-10-CM

## 2022-01-21 DIAGNOSIS — R33.9 INCOMPLETE EMPTYING OF BLADDER: Primary | ICD-10-CM

## 2022-01-21 LAB — GLUCOSE POC: NORMAL MG/DL

## 2022-01-21 PROCEDURE — 81001 URINALYSIS AUTO W/SCOPE: CPT

## 2022-01-21 PROCEDURE — 84153 ASSAY OF PSA TOTAL: CPT

## 2022-01-21 PROCEDURE — 85025 COMPLETE CBC W/AUTO DIFF WBC: CPT

## 2022-01-21 PROCEDURE — 99215 OFFICE O/P EST HI 40 MIN: CPT | Performed by: NURSE PRACTITIONER

## 2022-01-21 PROCEDURE — 86038 ANTINUCLEAR ANTIBODIES: CPT

## 2022-01-21 PROCEDURE — 82962 GLUCOSE BLOOD TEST: CPT | Performed by: NURSE PRACTITIONER

## 2022-01-21 PROCEDURE — 85652 RBC SED RATE AUTOMATED: CPT

## 2022-01-21 PROCEDURE — 73130 X-RAY EXAM OF HAND: CPT

## 2022-01-21 PROCEDURE — 86225 DNA ANTIBODY NATIVE: CPT

## 2022-01-21 PROCEDURE — 80053 COMPREHEN METABOLIC PANEL: CPT

## 2022-01-21 RX ORDER — TERAZOSIN 1 MG/1
1 CAPSULE ORAL
Qty: 30 CAPSULE | Refills: 0 | Status: SHIPPED | OUTPATIENT
Start: 2022-01-21

## 2022-01-21 RX ORDER — PREDNISONE 10 MG/1
TABLET ORAL
Qty: 63 TABLET | Refills: 0 | Status: SHIPPED | OUTPATIENT
Start: 2022-01-21

## 2022-01-21 NOTE — PROGRESS NOTES
2022 : Hester Heimlich (: 1970) is a 46 y.o. male, established patient, here for evaluation of the following chief complaint(s):  Joint Pain (both hands), LOW BACK PAIN, and Urinary Frequency    ASSESSMENT/PLAN:  Below is the assessment and plan developed based on review of pertinent history, physical exam, labs, studies, and medications. 1. Incomplete emptying of bladder  -     URINALYSIS W/MICROSCOPIC; Future  -     PSA W/ REFLX FREE PSA; Future  -     REFERRAL TO UROLOGY  -     terazosin (HYTRIN) 1 mg capsule; Take 1 Capsule by mouth nightly. For urination. Amber 1 tab antes de dormir para orinar., Normal, Disp-30 Capsule, R-0Please honor goodrx for approximately $14.00  2. Arthralgia of both hands  -     SED RATE (ESR); Future  -     METABOLIC PANEL, COMPREHENSIVE; Future  -     CBC WITH AUTOMATED DIFF; Future  -     STEEVN, DIRECT, W/REFLEX; Future  -     XR HAND RT MIN 3 V; Future  -     predniSONE (DELTASONE) 10 mg tablet; 6 po qd x 3d; 5 po qd x 3d; 4 po qd x 3d; 3 po qd x 3d; 2 po qd x 3d; 1 po qd x 3d, Normal, Disp-63 Tablet, R-0  3. Urinary frequency  -     AMB POC GLUCOSE BLOOD, BY GLUCOSE MONITORING DEVICE    Return for F2F MD provider (joint pain). SUBJECTIVE/OBJECTIVE:  HPI   Joint pain   worse when he is working. Right 3rd MCP joint and PIP joint of the index finger. 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 Also pain in the left hand but not today. Having pain of the left leg, from the knee radiating down to the left foot. Low back pain  Taking naproxen and pain goes down a little bit but doesn't go away. Urinary frequency  Urinating more frequently during the day. Denies blood in the urine. PMH  About 38 years ago he fell off a horse. He also reports that in his work in construction he has broken fingers. No known history of kidney stones.     Results for orders placed or performed in visit on 22   AMB POC GLUCOSE BLOOD, BY GLUCOSE MONITORING DEVICE   Result Value Ref Range    Glucose POC 77nf MG/DL     Review of Systems: Negative for: fever, chest pain, shortness of breath, leg swelling. No bowel or bladder incontinence. Social History:  reports that he quit smoking about 5 years ago. He has never used smokeless tobacco. He reports previous alcohol use. He reports that he does not use drugs. Family history unknown. Current Medications:   Current Outpatient Medications   Medication Sig    predniSONE (DELTASONE) 10 mg tablet 6 po qd x 3d; 5 po qd x 3d; 4 po qd x 3d; 3 po qd x 3d; 2 po qd x 3d; 1 po qd x 3d    terazosin (HYTRIN) 1 mg capsule Take 1 Capsule by mouth nightly. For urination. Amber 1 tab antes de dormir para orinar.  naproxen (NAPROSYN) 500 mg tablet Take 1 Tablet by mouth two (2) times daily (with meals). For your back and finger pain. Physical Examination:   Vitals:    01/21/22 1310   BP: 136/85   Pulse: 89   Temp: 97.9 °F (36.6 °C)   TempSrc: Temporal   SpO2: 98%   Weight: 175 lb (79.4 kg)     Right leg from back to the bottom of the heel. Right knee hurts worse. General appearance - well developed, no acute distress. Chest - clear to auscultation. Heart - regular rate and rhythm without murmurs, rubs, or gallops. Abdomen - bowel sounds present x 4, NT, ND  Extremities - swelling and pain with palpation at the right 3rd MCP joint and the right index PIP joint. Normal prostate exam, no nodules; heme negative stool. Knee exam - no crepitus, stable ligaments and menisci, no effusion. Midline, low back - pain with palpation noted. SLR bilaterally negative. An electronic signature was used to authenticate this note.   -- Yovani Johnson NP

## 2022-01-21 NOTE — PROGRESS NOTES
Results for orders placed or performed in visit on 01/21/22   AMB POC GLUCOSE BLOOD, BY GLUCOSE MONITORING DEVICE   Result Value Ref Range    Glucose POC 77nf MG/DL     Coordination of Care  1. Have you been to the ER, urgent care clinic since your last visit? Hospitalized since your last visit? No    2. Have you seen or consulted any other health care providers outside of the [address] [place] [address] since your last visit? Include any pap smears or colon screening. No    Does the patient need refills? N/A    Learning Assessment Complete?  yes

## 2022-01-21 NOTE — PROGRESS NOTES
Brenden Lopez seen at d/c, given AVS and reviewed today's visit with patient. Along with instructions on when to come back. Good Rx coupons given to patient as well as instructions on how to use at the pharmacy. Patient verbalized understanding. Patient was advised that they will be contacted by an [facility] Coordinator in regards to his Urology referral. He was also handed out a list of imaging center's so that he can choose the closest place or his preferred place to have his XR of the right hand taken. I have reviewed the provider's instructions with the patient, answering all questions to his satisfaction. Patient verbalized understanding.   Ronald Duran RN

## 2022-01-22 LAB
ALBUMIN SERPL-MCNC: 4.2 G/DL (ref 3.5–5)
ALBUMIN/GLOB SERPL: 1.2 {RATIO} (ref 1.1–2.2)
ALP SERPL-CCNC: 99 U/L (ref 45–117)
ALT SERPL-CCNC: 26 U/L (ref 12–78)
ANION GAP SERPL CALC-SCNC: 5 MMOL/L (ref 5–15)
APPEARANCE UR: CLEAR
AST SERPL-CCNC: 16 U/L (ref 15–37)
BACTERIA URNS QL MICRO: NEGATIVE /HPF
BASOPHILS # BLD: 0.1 K/UL (ref 0–0.1)
BASOPHILS NFR BLD: 1 % (ref 0–1)
BILIRUB SERPL-MCNC: 0.3 MG/DL (ref 0.2–1)
BILIRUB UR QL: NEGATIVE
BUN SERPL-MCNC: 15 MG/DL (ref 6–20)
BUN/CREAT SERPL: 18 (ref 12–20)
CALCIUM SERPL-MCNC: 9.3 MG/DL (ref 8.5–10.1)
CHLORIDE SERPL-SCNC: 109 MMOL/L (ref 97–108)
CO2 SERPL-SCNC: 24 MMOL/L (ref 21–32)
COLOR UR: NORMAL
CREAT SERPL-MCNC: 0.85 MG/DL (ref 0.7–1.3)
DIFFERENTIAL METHOD BLD: NORMAL
EOSINOPHIL # BLD: 0.1 K/UL (ref 0–0.4)
EOSINOPHIL NFR BLD: 1 % (ref 0–7)
EPITH CASTS URNS QL MICRO: NORMAL /LPF
ERYTHROCYTE [DISTWIDTH] IN BLOOD BY AUTOMATED COUNT: 12.4 % (ref 11.5–14.5)
ERYTHROCYTE [SEDIMENTATION RATE] IN BLOOD: 8 MM/HR (ref 0–20)
GLOBULIN SER CALC-MCNC: 3.6 G/DL (ref 2–4)
GLUCOSE SERPL-MCNC: 95 MG/DL (ref 65–100)
GLUCOSE UR STRIP.AUTO-MCNC: NEGATIVE MG/DL
HCT VFR BLD AUTO: 42.9 % (ref 36.6–50.3)
HGB BLD-MCNC: 13.9 G/DL (ref 12.1–17)
HGB UR QL STRIP: NEGATIVE
HYALINE CASTS URNS QL MICRO: NORMAL /LPF (ref 0–5)
IMM GRANULOCYTES # BLD AUTO: 0 K/UL (ref 0–0.04)
IMM GRANULOCYTES NFR BLD AUTO: 0 % (ref 0–0.5)
KETONES UR QL STRIP.AUTO: NEGATIVE MG/DL
LEUKOCYTE ESTERASE UR QL STRIP.AUTO: NEGATIVE
LYMPHOCYTES # BLD: 2.3 K/UL (ref 0.8–3.5)
LYMPHOCYTES NFR BLD: 29 % (ref 12–49)
MCH RBC QN AUTO: 29.8 PG (ref 26–34)
MCHC RBC AUTO-ENTMCNC: 32.4 G/DL (ref 30–36.5)
MCV RBC AUTO: 92.1 FL (ref 80–99)
MONOCYTES # BLD: 0.4 K/UL (ref 0–1)
MONOCYTES NFR BLD: 5 % (ref 5–13)
NEUTS SEG # BLD: 5.2 K/UL (ref 1.8–8)
NEUTS SEG NFR BLD: 64 % (ref 32–75)
NITRITE UR QL STRIP.AUTO: NEGATIVE
NRBC # BLD: 0 K/UL (ref 0–0.01)
NRBC BLD-RTO: 0 PER 100 WBC
PH UR STRIP: 5 [PH] (ref 5–8)
PLATELET # BLD AUTO: 275 K/UL (ref 150–400)
PMV BLD AUTO: 10.9 FL (ref 8.9–12.9)
POTASSIUM SERPL-SCNC: 4.1 MMOL/L (ref 3.5–5.1)
PROT SERPL-MCNC: 7.8 G/DL (ref 6.4–8.2)
PROT UR STRIP-MCNC: NEGATIVE MG/DL
RBC # BLD AUTO: 4.66 M/UL (ref 4.1–5.7)
RBC #/AREA URNS HPF: NORMAL /HPF (ref 0–5)
SODIUM SERPL-SCNC: 138 MMOL/L (ref 136–145)
SP GR UR REFRACTOMETRY: 1.02 (ref 1–1.03)
UROBILINOGEN UR QL STRIP.AUTO: 0.2 EU/DL (ref 0.2–1)
WBC # BLD AUTO: 8.1 K/UL (ref 4.1–11.1)
WBC URNS QL MICRO: NORMAL /HPF (ref 0–4)

## 2022-01-23 LAB
PSA SERPL-MCNC: 0.8 NG/ML (ref 0–4)
REFLEX CRITERIA: NORMAL

## 2022-01-24 LAB
ANA SER QL: POSITIVE
CENTROMERE B AB SER-ACNC: <0.2 AI (ref 0–0.9)
CHROMATIN AB SERPL-ACNC: <0.2 AI (ref 0–0.9)
DSDNA AB SER-ACNC: 4 IU/ML (ref 0–9)
ENA JO1 AB SER-ACNC: <0.2 AI (ref 0–0.9)
ENA RNP AB SER-ACNC: 1.6 AI (ref 0–0.9)
ENA SCL70 AB SER-ACNC: <0.2 AI (ref 0–0.9)
ENA SM AB SER-ACNC: <0.2 AI (ref 0–0.9)
ENA SS-A AB SER-ACNC: <0.2 AI (ref 0–0.9)
ENA SS-B AB SER-ACNC: <0.2 AI (ref 0–0.9)
SEE BELOW, 164869: ABNORMAL

## 2022-01-24 NOTE — PROGRESS NOTES
Dr. Octavio Lemons, can you please review patient's chart, x-ray result, and lab results?   Might he benefit from a rheumatologist or other specialist?  Thank you, Haven Tapia

## 2022-01-26 ENCOUNTER — VIRTUAL VISIT (OUTPATIENT)
Dept: FAMILY MEDICINE CLINIC | Age: 52
End: 2022-01-26

## 2022-01-26 DIAGNOSIS — Z71.89 COUNSELING AND COORDINATION OF CARE: Primary | ICD-10-CM

## 2022-01-26 NOTE — PROGRESS NOTES
V.V. patient has been screened. Scheduled for [place] on 1/31/22, to complete AN financial screening process.

## 2022-01-31 ENCOUNTER — OFFICE VISIT (OUTPATIENT)
Dept: FAMILY MEDICINE CLINIC | Age: 52
End: 2022-01-31

## 2022-01-31 DIAGNOSIS — Z71.89 COUNSELING AND COORDINATION OF CARE: Primary | ICD-10-CM

## 2022-01-31 PROCEDURE — 99080 SPECIAL REPORTS OR FORMS: CPT | Performed by: NURSE PRACTITIONER

## 2022-01-31 NOTE — PROGRESS NOTES
AN financial screening is incomplete. POI pending. Patient has been re-scheduled for [address] [facility] on 2/1/22. Patient has been screened for [facility]. Patient marked Food (score 3) as a primary need. OW has provided resources.

## 2022-02-01 ENCOUNTER — OFFICE VISIT (OUTPATIENT)
Dept: FAMILY MEDICINE CLINIC | Age: 52
End: 2022-02-01

## 2022-02-01 DIAGNOSIS — Z71.89 COUNSELING AND COORDINATION OF CARE: Primary | ICD-10-CM

## 2022-02-01 PROCEDURE — 99080 SPECIAL REPORTS OR FORMS: CPT | Performed by: NURSE PRACTITIONER

## 2022-02-01 NOTE — PROGRESS NOTES
AN financial screening has been completed. Patient has been instructed to call appointment line on or after 2/14/22.

## 2022-02-10 ENCOUNTER — OFFICE VISIT (OUTPATIENT)
Dept: FAMILY MEDICINE CLINIC | Age: 52
End: 2022-02-10

## 2022-02-10 VITALS
HEIGHT: 64 IN | OXYGEN SATURATION: 99 % | DIASTOLIC BLOOD PRESSURE: 79 MMHG | HEART RATE: 72 BPM | BODY MASS INDEX: 29.53 KG/M2 | TEMPERATURE: 97.9 F | WEIGHT: 173 LBS | SYSTOLIC BLOOD PRESSURE: 138 MMHG

## 2022-02-10 DIAGNOSIS — M79.641 PAIN OF RIGHT HAND: Primary | ICD-10-CM

## 2022-02-10 PROCEDURE — 99214 OFFICE O/P EST MOD 30 MIN: CPT | Performed by: NURSE PRACTITIONER

## 2022-02-10 NOTE — PROGRESS NOTES
Explained to patient that someone from the [place] [address] will call them to explain next steps for [facility] referral once their referral has been reviewed. The referral is to see the ophthalmology . An After Visit Summary was printed and reviewed with the patient. Informed patient to give name and date of birth when picking up medication. Patient verbalized understanding.   Yolie Mitchell
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
## 2022-02-10 NOTE — PROGRESS NOTES
Coordination of Care  1. Have you been to the ER, urgent care clinic since your last visit? Hospitalized since your last visit? No    2. Have you seen or consulted any other health care providers outside of the [address] [place] [address] since your last visit? Include any pap smears or colon screening. No    Does the patient need refills? YES    Learning Assessment Complete?  yes  Depression Screening complete in the past 12 months? yes

## 2022-03-02 ENCOUNTER — DOCUMENTATION ONLY (OUTPATIENT)
Dept: FAMILY MEDICINE CLINIC | Age: 52
End: 2022-03-02

## 2022-03-07 NOTE — PROGRESS NOTES
HPI: Amber Vines (: 1970) is a 46 y.o. male, patient, here for evaluation of the following chief complaint(s):    Finger Pain (Right 2nd and 3rd fingers , Pt hit fingers now they cause a lot of pain )  Patient is seen today to evaluate his right hand. The patient has been complaining of pain to the dorsal right hospital the right hand. He may have had on occasion but mostly complains of pain over the index and middle MP joints. He has maintained reasonable arc of range of motion with slight stiffness in the MP joints. He reported that he has had pain since the end of the summer 2021. He denies any significant numbness or tingling or any significant pain in the left hand and is seen for further treatment. Vitals:  Ht 5' 4\" (1.626 m)   BMI 29.70 kg/m²    Body mass index is 29.7 kg/m². No Known Allergies    Current Outpatient Medications   Medication Sig    alfuzosin SR (UROXATRAL) 10 mg SR tablet Take 10 mg by mouth nightly. (Patient not taking: Reported on 3/8/2022)    doxycycline (VIBRAMYCIN) 100 mg capsule Take 100 mg by mouth two (2) times a day. (Patient not taking: Reported on 3/8/2022)    ketorolac (TORADOL) 10 mg tablet TAKE 1 TABLET BY MOUTH THREE TIMES DAILY AS NEEDED FOR PAIN (Patient not taking: Reported on 3/8/2022)    predniSONE (DELTASONE) 10 mg tablet 6 po qd x 3d; 5 po qd x 3d; 4 po qd x 3d; 3 po qd x 3d; 2 po qd x 3d; 1 po qd x 3d (Patient not taking: Reported on 3/8/2022)    terazosin (HYTRIN) 1 mg capsule Take 1 Capsule by mouth nightly. For urination. Amber 1 tab antes de dormir para orinar. (Patient not taking: Reported on 3/8/2022)    naproxen (NAPROSYN) 500 mg tablet Take 1 Tablet by mouth two (2) times daily (with meals). For your back and finger pain.  (Patient not taking: Reported on 2/10/2022)     Current Facility-Administered Medications   Medication    triamcinolone acetonide (KENALOG-40) 40 mg/mL injection 40 mg    bupivacaine (PF) (MARCAINE) 0.5 % (5 mg/mL) injection 5 mg       History reviewed. No pertinent past medical history. Past Surgical History:   Procedure Laterality Date    COLONOSCOPY  2014            Family History   Family history unknown: Yes        Social History     Tobacco Use    Smoking status: Former Smoker     Quit date: 2016     Years since quittin.8    Smokeless tobacco: Never Used   Substance Use Topics    Alcohol use: Yes     Comment: occas    Drug use: No        Review of Systems   All other systems reviewed and are negative. Physical Exam    Both hands in general demonstrate good range of motion but he has 50 to 55 degrees of index middle MP joint flexion and some swelling at these joints clinical concern for arthritis. His extensor tendons remains centralized. No digital clicking or locking. Otherwise good sensation and refill throughout the hands. Imaging:    XR Results (most recent):  Results from Appointment encounter on 22    XR HAND RT MIN 3 V    Narrative  AP, lateral and oblique x-ray of the right hand reveals degenerative irregularities of the middle and index metacarpal heads with slight narrowing consistent with index middle MP joint arthritis mild but no fracture. Periarticular calcification just ulnar to the index metacarpal head. ASSESSMENT/PLAN:  Below is the assessment and plan developed based on review of pertinent history, physical exam, labs, studies, and medications. Patient examination appeared most consistent clinically with right index middle MP joint osteoarthritis. X-rays had shown some calcifications just ulnar to the index metacarpal head suggesting calcific tendinitis but he has had symptoms for 7 months. He tolerated injection therapy for right index middle MP joint on 3/8/2022. Future consideration can be given for repeat injections.   Currently does not appear that he needs to consider surgical joint replacement but certainly that remains an option. Follow-up in 4 to 6 weeks sooner if needed. Cystic change proximal ulnar lunate also suggestive of ulnar impaction though not symptomatic on today's visit. 1. Right hand pain  2. Primary osteoarthritis, right hand  -     XR HAND RT MIN 3 V; Future  -     DRAIN/INJECT SMALL JOINT/BURSA  -     triamcinolone acetonide (KENALOG-40) 40 mg/mL injection 40 mg; 40 mg, Other, ONCE, 1 dose, On Tue 3/8/22 at 1500  -     bupivacaine (PF) (MARCAINE) 0.5 % (5 mg/mL) injection 5 mg; 5 mg (1 mL), Other, ONCE, 1 dose, On Tue 3/8/22 at 1500  3. Calcific tendinitis of right hand    Informed consent was obtained. The patient received right index middle intra-articular MP joint injection therapy splitting 40 mg of triamcinolone mixed with 1 cc of half percent bupivacaine. He tolerated injection therapy well. Return in about 4 weeks (around 4/5/2022). An electronic signature was used to authenticate this note.   -- Gonzalo Berger MD

## 2022-03-08 ENCOUNTER — OFFICE VISIT (OUTPATIENT)
Dept: ORTHOPEDIC SURGERY | Age: 52
End: 2022-03-08
Payer: SUBSIDIZED

## 2022-03-08 VITALS — BODY MASS INDEX: 29.7 KG/M2 | HEIGHT: 64 IN

## 2022-03-08 DIAGNOSIS — M79.641 RIGHT HAND PAIN: Primary | ICD-10-CM

## 2022-03-08 DIAGNOSIS — M19.041 PRIMARY OSTEOARTHRITIS, RIGHT HAND: ICD-10-CM

## 2022-03-08 DIAGNOSIS — M65.241 CALCIFIC TENDINITIS OF RIGHT HAND: ICD-10-CM

## 2022-03-08 PROCEDURE — 99203 OFFICE O/P NEW LOW 30 MIN: CPT | Performed by: ORTHOPAEDIC SURGERY

## 2022-03-08 PROCEDURE — 20600 DRAIN/INJ JOINT/BURSA W/O US: CPT | Performed by: ORTHOPAEDIC SURGERY

## 2022-03-08 RX ORDER — BUPIVACAINE HYDROCHLORIDE 5 MG/ML
1 INJECTION, SOLUTION EPIDURAL; INTRACAUDAL ONCE
Status: COMPLETED | OUTPATIENT
Start: 2022-03-08 | End: 2022-03-08

## 2022-03-08 RX ORDER — TRIAMCINOLONE ACETONIDE 40 MG/ML
40 INJECTION, SUSPENSION INTRA-ARTICULAR; INTRAMUSCULAR ONCE
Status: COMPLETED | OUTPATIENT
Start: 2022-03-08 | End: 2022-03-08

## 2022-03-08 RX ADMIN — BUPIVACAINE HYDROCHLORIDE 5 MG: 5 INJECTION, SOLUTION EPIDURAL; INTRACAUDAL at 14:41

## 2022-03-08 RX ADMIN — TRIAMCINOLONE ACETONIDE 40 MG: 40 INJECTION, SUSPENSION INTRA-ARTICULAR; INTRAMUSCULAR at 14:41

## 2022-03-08 NOTE — LETTER
3/8/2022    Patient: Brook Lira   YOB: 1970   Date of Visit: 3/8/2022     Zoila Mensah NP  [address] [address]  Via In Basket    Dear Zoila Mensah NP,      Thank you for referring Mr. Brook Lira to [facility] for evaluation. My notes for this consultation are attached. If you have questions, please do not hesitate to call me. I look forward to following your patient along with you.       Sincerely,    Areli Buckner MD

## 2022-03-18 ENCOUNTER — TELEPHONE (OUTPATIENT)
Dept: FAMILY MEDICINE CLINIC | Age: 52
End: 2022-03-18

## 2022-03-21 ENCOUNTER — TELEPHONE (OUTPATIENT)
Dept: FAMILY MEDICINE CLINIC | Age: 52
End: 2022-03-21

## 2022-03-21 NOTE — TELEPHONE ENCOUNTER
V.V. Patient called OW after receiving OW's call. Patient said he has not gone to the Food bank but he has the information. As per patient, he's having marital problems and asked for legal resources. OW provided [facility] and [facility] information to patient. Food score 4.

## 2022-03-24 ENCOUNTER — TELEPHONE (OUTPATIENT)
Dept: FAMILY MEDICINE CLINIC | Age: 52
End: 2022-03-24

## 2022-03-24 NOTE — TELEPHONE ENCOUNTER
ZACH MAC called patient to assist with bills and was unable to speak with patient. Left a vm asking to call OW back.

## 2022-03-25 ENCOUNTER — TELEPHONE (OUTPATIENT)
Dept: FAMILY MEDICINE CLINIC | Age: 52
End: 2022-03-25

## 2022-03-25 NOTE — TELEPHONE ENCOUNTER
PAN. OW received a message from patient asking for appt. OW called pt and was not able to speak with him. OW left a vm asking to call back.

## 2022-03-28 ENCOUNTER — TELEPHONE (OUTPATIENT)
Dept: FAMILY MEDICINE CLINIC | Age: 52
End: 2022-03-28

## 2022-03-28 NOTE — TELEPHONE ENCOUNTER
ZACH MAC called patient to assist with bills. Patient said he'd call OW later because he was busy at work.

## 2022-03-28 NOTE — TELEPHONE ENCOUNTER
V.V. Patient called OW and both started financial screening for [employer]. An appt was made for 4/1/22 at  10:30am. Patient replied NO to all covid19 screening questions. Pending POI and bank statements.

## 2022-04-01 ENCOUNTER — OFFICE VISIT (OUTPATIENT)
Dept: FAMILY MEDICINE CLINIC | Age: 52
End: 2022-04-01

## 2022-04-01 DIAGNOSIS — Z71.89 COUNSELING AND COORDINATION OF CARE: Primary | ICD-10-CM

## 2022-04-01 PROCEDURE — 99080 SPECIAL REPORTS OR FORMS: CPT | Performed by: PHYSICIAN ASSISTANT

## 2022-04-01 NOTE — PROGRESS NOTES
OW met with patient and assisted with bills. [employer] application was completed.  Pt will mail it to [employer].

## 2022-05-03 ENCOUNTER — TELEPHONE (OUTPATIENT)
Dept: FAMILY MEDICINE CLINIC | Age: 52
End: 2022-05-03

## 2022-05-03 NOTE — TELEPHONE ENCOUNTER
OW called patient back after receiving a vm from him. Patient is asking about his FA application. It's been only a month since patient mailed application to [employer]. OW asked patient to call [employer] in 2 weeks, since it takes them two months to process applications. [employer] has still not received it or started to process patient's application. It's not scanned on patient's . Patient verbalized understanding.

## 2022-05-04 ENCOUNTER — DOCUMENTATION ONLY (OUTPATIENT)
Dept: FAMILY MEDICINE CLINIC | Age: 52
End: 2022-05-04

## 2022-05-04 NOTE — PROGRESS NOTES
5/4/2022  Seen by Urology on 5/2/22. CT negative for stones. Back pain likely musculoskeletal.  Given ketorolac. Continue Uroxatrol for acute prostatitis. Recheck 6 months.   Cole Camara, NP

## 2023-05-21 RX ORDER — NAPROXEN 500 MG/1
500 TABLET ORAL 2 TIMES DAILY WITH MEALS
COMMUNITY
Start: 2022-01-04

## 2023-05-21 RX ORDER — KETOROLAC TROMETHAMINE 10 MG/1
TABLET, FILM COATED ORAL
COMMUNITY
Start: 2022-02-21

## 2023-05-21 RX ORDER — PREDNISONE 10 MG/1
TABLET ORAL
COMMUNITY
Start: 2022-01-21

## 2023-05-21 RX ORDER — DOXYCYCLINE HYCLATE 100 MG/1
100 CAPSULE ORAL 2 TIMES DAILY
COMMUNITY
Start: 2022-02-21

## 2023-05-21 RX ORDER — ALFUZOSIN HYDROCHLORIDE 10 MG/1
10 TABLET, EXTENDED RELEASE ORAL NIGHTLY
COMMUNITY
Start: 2022-02-21

## 2023-05-21 RX ORDER — TERAZOSIN 1 MG/1
1 CAPSULE ORAL
COMMUNITY
Start: 2022-01-21

## 2023-09-05 ENCOUNTER — HOSPITAL ENCOUNTER (OUTPATIENT)
Facility: HOSPITAL | Age: 53
Setting detail: SPECIMEN
Discharge: HOME | End: 2023-09-08

## 2023-09-05 ENCOUNTER — OFFICE VISIT (OUTPATIENT)
Age: 53
End: 2023-09-05

## 2023-09-05 VITALS
DIASTOLIC BLOOD PRESSURE: 98 MMHG | HEIGHT: 64 IN | BODY MASS INDEX: 31.41 KG/M2 | HEART RATE: 89 BPM | SYSTOLIC BLOOD PRESSURE: 146 MMHG | TEMPERATURE: 97.9 F | OXYGEN SATURATION: 97 % | WEIGHT: 184 LBS

## 2023-09-05 DIAGNOSIS — Z13.9 ENCOUNTER FOR SCREENING: ICD-10-CM

## 2023-09-05 DIAGNOSIS — R03.0 ELEVATED BLOOD PRESSURE READING: ICD-10-CM

## 2023-09-05 DIAGNOSIS — K92.0 HEMATEMESIS WITH NAUSEA: Primary | ICD-10-CM

## 2023-09-05 LAB — HEMOGLOBIN, POC: 13 G/DL

## 2023-09-05 PROCEDURE — 85025 COMPLETE CBC W/AUTO DIFF WBC: CPT

## 2023-09-05 PROCEDURE — 36415 COLL VENOUS BLD VENIPUNCTURE: CPT

## 2023-09-05 PROCEDURE — 85018 HEMOGLOBIN: CPT | Performed by: NURSE PRACTITIONER

## 2023-09-05 PROCEDURE — 99213 OFFICE O/P EST LOW 20 MIN: CPT | Performed by: NURSE PRACTITIONER

## 2023-09-05 SDOH — ECONOMIC STABILITY: FOOD INSECURITY: WITHIN THE PAST 12 MONTHS, THE FOOD YOU BOUGHT JUST DIDN'T LAST AND YOU DIDN'T HAVE MONEY TO GET MORE.: NEVER TRUE

## 2023-09-05 SDOH — ECONOMIC STABILITY: HOUSING INSECURITY
IN THE LAST 12 MONTHS, WAS THERE A TIME WHEN YOU DID NOT HAVE A STEADY PLACE TO SLEEP OR SLEPT IN A SHELTER (INCLUDING NOW)?: NO

## 2023-09-05 SDOH — ECONOMIC STABILITY: FOOD INSECURITY: WITHIN THE PAST 12 MONTHS, YOU WORRIED THAT YOUR FOOD WOULD RUN OUT BEFORE YOU GOT MONEY TO BUY MORE.: NEVER TRUE

## 2023-09-05 SDOH — ECONOMIC STABILITY: INCOME INSECURITY: HOW HARD IS IT FOR YOU TO PAY FOR THE VERY BASICS LIKE FOOD, HOUSING, MEDICAL CARE, AND HEATING?: SOMEWHAT HARD

## 2023-09-05 ASSESSMENT — PATIENT HEALTH QUESTIONNAIRE - PHQ9
SUM OF ALL RESPONSES TO PHQ QUESTIONS 1-9: 1
1. LITTLE INTEREST OR PLEASURE IN DOING THINGS: 0
SUM OF ALL RESPONSES TO PHQ QUESTIONS 1-9: 1
SUM OF ALL RESPONSES TO PHQ QUESTIONS 1-9: 1
2. FEELING DOWN, DEPRESSED OR HOPELESS: 1
SUM OF ALL RESPONSES TO PHQ QUESTIONS 1-9: 1
SUM OF ALL RESPONSES TO PHQ9 QUESTIONS 1 & 2: 1

## 2023-09-05 NOTE — PROGRESS NOTES
2023 : Moy Harmon (: 1970) is a 46 y.o. male,  established patient, here for evaluation of the following chief complaint(s):  Hand Pain (Folow up), Heartburn, and Eye Problem     ASSESSMENT/PLAN:  Below is the assessment and plan developed based on review of pertinent history, physical exam, labs, studies, and medications. 1. Hematemesis with nausea  -     CBC with Auto Differential; Future  -     AMB POC HEMOGLOBIN (HGB)  2. Elevated blood pressure reading  3. Encounter for screening    Return for 20 min fasting lab, blood pressure, hand, heartburn.  -wants cholesterol checked, not fasting today    SUBJECTIVE/OBJECTIVE:  HPI My last note: 2022  Seen by Urology on 22. CT negative for stones. Back pain likely musculoskeletal.  Given ketorolac. Continue Uroxatrol for acute prostatitis. Recheck 6 months. He is doing well with the urine. When he lies back he feels like the food wants to come up but it doesn't. He has had vomiting with red water and like black coffee. It has been this way for 3-4 months. Nonsmoker for 3 years, smoked 8 yrs after that. Drinks 1-2 beers a week, on a Saturday or a . Says the reflux does not get worse unless he drinks 4-5 beers. He drinks coffee in the morning sometimes. Last happened 2 nights ago.   Results for orders placed or performed in visit on 23   AMB POC HEMOGLOBIN (HGB)   Result Value Ref Range    Hemoglobin, POC 13 G/DL     Current Medications:   Current Outpatient Medications   Medication Sig Dispense Refill    alfuzosin (UROXATRAL) 10 MG extended release tablet Take 1 tablet by mouth nightly (Patient not taking: Reported on 2023)      doxycycline hyclate (VIBRAMYCIN) 100 MG capsule Take 1 capsule by mouth 2 times daily (Patient not taking: Reported on 2023)      ketorolac (TORADOL) 10 MG tablet TAKE 1 TABLET BY MOUTH THREE TIMES DAILY AS NEEDED FOR PAIN (Patient not taking: Reported on 2023)

## 2023-09-05 NOTE — PROGRESS NOTES
Coordination of Care  1. Have you been to the ER, urgent care clinic since your last visit? Hospitalized since your last visit? no    2. Have you seen or consulted any other health care providers outside of the [address] [place] [address] since your last visit? Include any pap smears or colon screening. no    Does the patient need refills? yes    Learning Assessment Complete?  yes  Depression Screening complete in the past 12 months? yes

## 2023-09-05 NOTE — PROGRESS NOTES
Name and  confirmed w/ patient. An After Visit Summary was provided and all discharge instructions were reviewed with the patient including: f/up appt. POC HgB obtained -- 13.0 result. RN explained that we will still get the full blood draw and call him with further instructions. We also went over when/where to seek emergency care if needed. Time for questions and answers provided, patient verbalized understanding. Patient discharged from clinic in stable condition.  details per consult note.

## 2023-09-06 LAB
BASOPHILS # BLD: 0.1 K/UL (ref 0–0.1)
BASOPHILS NFR BLD: 1 % (ref 0–1)
DIFFERENTIAL METHOD BLD: NORMAL
EOSINOPHIL # BLD: 0.1 K/UL (ref 0–0.4)
EOSINOPHIL NFR BLD: 2 % (ref 0–7)
ERYTHROCYTE [DISTWIDTH] IN BLOOD BY AUTOMATED COUNT: 12.5 % (ref 11.5–14.5)
HCT VFR BLD AUTO: 41.7 % (ref 36.6–50.3)
HGB BLD-MCNC: 13.2 G/DL (ref 12.1–17)
IMM GRANULOCYTES # BLD AUTO: 0 K/UL (ref 0–0.04)
IMM GRANULOCYTES NFR BLD AUTO: 0 % (ref 0–0.5)
LYMPHOCYTES # BLD: 2.2 K/UL (ref 0.8–3.5)
LYMPHOCYTES NFR BLD: 29 % (ref 12–49)
MCH RBC QN AUTO: 29.8 PG (ref 26–34)
MCHC RBC AUTO-ENTMCNC: 31.7 G/DL (ref 30–36.5)
MCV RBC AUTO: 94.1 FL (ref 80–99)
MONOCYTES # BLD: 0.5 K/UL (ref 0–1)
MONOCYTES NFR BLD: 6 % (ref 5–13)
NEUTS SEG # BLD: 4.8 K/UL (ref 1.8–8)
NEUTS SEG NFR BLD: 62 % (ref 32–75)
NRBC # BLD: 0 K/UL (ref 0–0.01)
NRBC BLD-RTO: 0 PER 100 WBC
PLATELET # BLD AUTO: 281 K/UL (ref 150–400)
PMV BLD AUTO: 10.7 FL (ref 8.9–12.9)
RBC # BLD AUTO: 4.43 M/UL (ref 4.1–5.7)
WBC # BLD AUTO: 7.6 K/UL (ref 4.1–11.1)

## 2023-09-12 ENCOUNTER — OFFICE VISIT (OUTPATIENT)
Age: 53
End: 2023-09-12

## 2023-09-12 VITALS
OXYGEN SATURATION: 97 % | TEMPERATURE: 97.9 F | BODY MASS INDEX: 31.53 KG/M2 | SYSTOLIC BLOOD PRESSURE: 134 MMHG | DIASTOLIC BLOOD PRESSURE: 93 MMHG | WEIGHT: 183.8 LBS | HEART RATE: 88 BPM

## 2023-09-12 DIAGNOSIS — I10 PRIMARY HYPERTENSION: ICD-10-CM

## 2023-09-12 DIAGNOSIS — K21.00 GASTROESOPHAGEAL REFLUX DISEASE WITH ESOPHAGITIS WITHOUT HEMORRHAGE: Primary | ICD-10-CM

## 2023-09-12 DIAGNOSIS — R06.83 SNORING: ICD-10-CM

## 2023-09-12 PROCEDURE — 3074F SYST BP LT 130 MM HG: CPT | Performed by: NURSE PRACTITIONER

## 2023-09-12 PROCEDURE — 3078F DIAST BP <80 MM HG: CPT | Performed by: NURSE PRACTITIONER

## 2023-09-12 PROCEDURE — 99214 OFFICE O/P EST MOD 30 MIN: CPT | Performed by: NURSE PRACTITIONER

## 2023-09-12 RX ORDER — OMEPRAZOLE 20 MG/1
20 CAPSULE, DELAYED RELEASE ORAL
Qty: 30 CAPSULE | Refills: 1 | Status: SHIPPED | OUTPATIENT
Start: 2023-09-12

## 2023-09-12 RX ORDER — LISINOPRIL 5 MG/1
5 TABLET ORAL DAILY
Qty: 90 TABLET | Refills: 0 | Status: SHIPPED | OUTPATIENT
Start: 2023-09-12

## 2023-09-12 SDOH — ECONOMIC STABILITY: FOOD INSECURITY: WITHIN THE PAST 12 MONTHS, YOU WORRIED THAT YOUR FOOD WOULD RUN OUT BEFORE YOU GOT MONEY TO BUY MORE.: SOMETIMES TRUE

## 2023-09-12 SDOH — ECONOMIC STABILITY: INCOME INSECURITY: HOW HARD IS IT FOR YOU TO PAY FOR THE VERY BASICS LIKE FOOD, HOUSING, MEDICAL CARE, AND HEATING?: SOMEWHAT HARD

## 2023-09-12 SDOH — ECONOMIC STABILITY: FOOD INSECURITY: WITHIN THE PAST 12 MONTHS, THE FOOD YOU BOUGHT JUST DIDN'T LAST AND YOU DIDN'T HAVE MONEY TO GET MORE.: SOMETIMES TRUE

## 2023-09-12 ASSESSMENT — PATIENT HEALTH QUESTIONNAIRE - PHQ9
SUM OF ALL RESPONSES TO PHQ QUESTIONS 1-9: 2
2. FEELING DOWN, DEPRESSED OR HOPELESS: 1
SUM OF ALL RESPONSES TO PHQ9 QUESTIONS 1 & 2: 6
1. LITTLE INTEREST OR PLEASURE IN DOING THINGS: 3
SUM OF ALL RESPONSES TO PHQ QUESTIONS 1-9: 6
SUM OF ALL RESPONSES TO PHQ QUESTIONS 1-9: 2
2. FEELING DOWN, DEPRESSED OR HOPELESS: 3
SUM OF ALL RESPONSES TO PHQ9 QUESTIONS 1 & 2: 2
1. LITTLE INTEREST OR PLEASURE IN DOING THINGS: 1
SUM OF ALL RESPONSES TO PHQ QUESTIONS 1-9: 6

## 2023-09-12 NOTE — PROGRESS NOTES
Talisha Said [id number] assisted with intake  Coordination of Care  1. Have you been to the ER, urgent care clinic since your last visit? NO   Hospitalized since your last visit? NO    2. Have you seen or consulted any other health care providers outside of the [address] [place] [address] since your last visit? NO  Include any pap smears or colon screening. NO    Does the patient need refills? N/A    Learning Assessment Complete? no  Depression Screening complete in the past 12 months?   YES [phone]

## 2023-09-12 NOTE — PROGRESS NOTES
2023 : Jacky Dave (: 1970) is a 46 y.o. male,  established patient, here for evaluation of the following chief complaint(s):  Nausea (Reflux  and a cough) and Other (PHQ score =6 for depression screening)    BP up today.  -ankle (I did not address today)  -cough  -reflux  -neck pain (I did not address today)  Treat reflux, GERD friendly diet  ASSESSMENT/PLAN:  Below is the assessment and plan developed based on review of pertinent history, physical exam, labs, studies, and medications. 1. Gastroesophageal reflux disease with esophagitis without hemorrhage  -     omeprazole (PRILOSEC) 20 MG delayed release capsule; Take 1 capsule by mouth every morning (before breakfast), Disp-30 capsule, R-1Normal  2. Primary hypertension  -     lisinopril (PRINIVIL;ZESTRIL) 5 MG tablet; Take 1 tablet by mouth daily, Disp-90 tablet, R-0Normal  3. Snoring  -     Amb External Referral To Sleep Medicine    Return for 4 wks LK 20 min. SUBJECTIVE/OBJECTIVE:  HPI Feels like food gets stuck in his throat, at night. He also describes a cough that is sometimes dry, sometimes with phlegm. Describes the sensation of the throat as feeling \"like sand\". More cough while lying down at night. He wakes up short of breath at night, he thinks related to the cough. He also reports that he snores a lot per his wife. He has noted right sided neck pain. No prior injury. Denies dizziness. Says he has to look down to make sure he won't trip over something. He also has pain of his right ankle. No prior injury. His brother passed away over the weekend. His brother \"had heart problems. \" Family members said he clutched his chest before he . Fuad is the youngest brother. The brother who  was the oldest brother. PHQ = 6 No self harm thoughts   No results found for any visits on 23.   Current Medications:   Current Outpatient Medications   Medication Sig Dispense Refill

## 2023-09-12 NOTE — PROGRESS NOTES
Fuad Farris seen at discharge. Full name and  verified; After visit Summary was given. RN reviewed today's visit with patient, as well as instructions on when it is recommended to return for follow-up visit. I have reviewed the provider's instructions with the patient, answering all questions to his satisfaction. Patient verbalized understanding. Patient was advised that he will be contacted by a [facility] Coordinator in regards to his sleep medicine referral. Patient was notified that [facility] has its own financial screening. Due to language barrier, an  ([id number]) was used during the encounter with this patient.    Alla Ritchie RN

## 2023-09-26 ENCOUNTER — OFFICE VISIT (OUTPATIENT)
Age: 53
End: 2023-09-26

## 2023-09-26 DIAGNOSIS — Z71.89 COUNSELING AND COORDINATION OF CARE: Primary | ICD-10-CM

## 2023-09-26 PROCEDURE — 99080 SPECIAL REPORTS OR FORMS: CPT | Performed by: NURSE PRACTITIONER

## 2023-09-26 SDOH — ECONOMIC STABILITY: FOOD INSECURITY: WITHIN THE PAST 12 MONTHS, YOU WORRIED THAT YOUR FOOD WOULD RUN OUT BEFORE YOU GOT MONEY TO BUY MORE.: NEVER TRUE

## 2023-09-26 SDOH — ECONOMIC STABILITY: TRANSPORTATION INSECURITY
IN THE PAST 12 MONTHS, HAS THE LACK OF TRANSPORTATION KEPT YOU FROM MEDICAL APPOINTMENTS OR FROM GETTING MEDICATIONS?: NO

## 2023-09-26 SDOH — ECONOMIC STABILITY: INCOME INSECURITY: IN THE LAST 12 MONTHS, WAS THERE A TIME WHEN YOU WERE NOT ABLE TO PAY THE MORTGAGE OR RENT ON TIME?: NO

## 2023-09-26 SDOH — ECONOMIC STABILITY: FOOD INSECURITY: WITHIN THE PAST 12 MONTHS, THE FOOD YOU BOUGHT JUST DIDN'T LAST AND YOU DIDN'T HAVE MONEY TO GET MORE.: NEVER TRUE

## 2023-09-26 SDOH — ECONOMIC STABILITY: TRANSPORTATION INSECURITY
IN THE PAST 12 MONTHS, HAS LACK OF TRANSPORTATION KEPT YOU FROM MEETINGS, WORK, OR FROM GETTING THINGS NEEDED FOR DAILY LIVING?: NO

## 2023-09-26 NOTE — PROGRESS NOTES
Patient is over income for the AN program. Patient stated he would be interested in other options where he can make a payment plan. Message has been sent to provider and Venkatesh Roy. Referral will be closed.

## 2023-10-16 ENCOUNTER — OFFICE VISIT (OUTPATIENT)
Age: 53
End: 2023-10-16

## 2023-10-16 ENCOUNTER — HOSPITAL ENCOUNTER (OUTPATIENT)
Facility: HOSPITAL | Age: 53
Setting detail: SPECIMEN
Discharge: HOME | End: 2023-10-19

## 2023-10-16 VITALS
BODY MASS INDEX: 31.22 KG/M2 | DIASTOLIC BLOOD PRESSURE: 94 MMHG | HEART RATE: 71 BPM | SYSTOLIC BLOOD PRESSURE: 148 MMHG | OXYGEN SATURATION: 97 % | WEIGHT: 182 LBS | TEMPERATURE: 97.7 F

## 2023-10-16 DIAGNOSIS — M54.2 NECK PAIN: Primary | ICD-10-CM

## 2023-10-16 DIAGNOSIS — K21.00 GASTROESOPHAGEAL REFLUX DISEASE WITH ESOPHAGITIS WITHOUT HEMORRHAGE: ICD-10-CM

## 2023-10-16 DIAGNOSIS — I10 PRIMARY HYPERTENSION: ICD-10-CM

## 2023-10-16 DIAGNOSIS — Z23 NEED FOR IMMUNIZATION AGAINST INFLUENZA: ICD-10-CM

## 2023-10-16 PROCEDURE — 99213 OFFICE O/P EST LOW 20 MIN: CPT | Performed by: NURSE PRACTITIONER

## 2023-10-16 PROCEDURE — 3080F DIAST BP >= 90 MM HG: CPT | Performed by: NURSE PRACTITIONER

## 2023-10-16 PROCEDURE — 90471 IMMUNIZATION ADMIN: CPT | Performed by: NURSE PRACTITIONER

## 2023-10-16 PROCEDURE — 80061 LIPID PANEL: CPT

## 2023-10-16 PROCEDURE — 36415 COLL VENOUS BLD VENIPUNCTURE: CPT

## 2023-10-16 PROCEDURE — 3077F SYST BP >= 140 MM HG: CPT | Performed by: NURSE PRACTITIONER

## 2023-10-16 PROCEDURE — 90686 IIV4 VACC NO PRSV 0.5 ML IM: CPT | Performed by: NURSE PRACTITIONER

## 2023-10-16 RX ORDER — LISINOPRIL 10 MG/1
10 TABLET ORAL DAILY
Qty: 90 TABLET | Refills: 1 | Status: SHIPPED | OUTPATIENT
Start: 2023-10-16

## 2023-10-16 RX ORDER — CELECOXIB 100 MG/1
100 CAPSULE ORAL 2 TIMES DAILY
Qty: 30 CAPSULE | Refills: 0 | Status: SHIPPED | OUTPATIENT
Start: 2023-10-16

## 2023-10-16 RX ORDER — OMEPRAZOLE 20 MG/1
20 CAPSULE, DELAYED RELEASE ORAL
Qty: 30 CAPSULE | Refills: 2 | Status: SHIPPED | OUTPATIENT
Start: 2023-10-16

## 2023-10-16 SDOH — ECONOMIC STABILITY: INCOME INSECURITY: HOW HARD IS IT FOR YOU TO PAY FOR THE VERY BASICS LIKE FOOD, HOUSING, MEDICAL CARE, AND HEATING?: SOMEWHAT HARD

## 2023-10-16 SDOH — ECONOMIC STABILITY: FOOD INSECURITY: WITHIN THE PAST 12 MONTHS, THE FOOD YOU BOUGHT JUST DIDN'T LAST AND YOU DIDN'T HAVE MONEY TO GET MORE.: NEVER TRUE

## 2023-10-16 SDOH — ECONOMIC STABILITY: FOOD INSECURITY: WITHIN THE PAST 12 MONTHS, YOU WORRIED THAT YOUR FOOD WOULD RUN OUT BEFORE YOU GOT MONEY TO BUY MORE.: NEVER TRUE

## 2023-10-16 ASSESSMENT — PATIENT HEALTH QUESTIONNAIRE - PHQ9
SUM OF ALL RESPONSES TO PHQ QUESTIONS 1-9: 0
1. LITTLE INTEREST OR PLEASURE IN DOING THINGS: 0
SUM OF ALL RESPONSES TO PHQ QUESTIONS 1-9: 0
SUM OF ALL RESPONSES TO PHQ9 QUESTIONS 1 & 2: 0
SUM OF ALL RESPONSES TO PHQ QUESTIONS 1-9: 0
2. FEELING DOWN, DEPRESSED OR HOPELESS: 0
SUM OF ALL RESPONSES TO PHQ QUESTIONS 1-9: 0

## 2023-10-16 ASSESSMENT — LIFESTYLE VARIABLES
HOW MANY STANDARD DRINKS CONTAINING ALCOHOL DO YOU HAVE ON A TYPICAL DAY: PATIENT DOES NOT DRINK
HOW OFTEN DO YOU HAVE A DRINK CONTAINING ALCOHOL: NEVER

## 2023-10-17 LAB
CHOLEST SERPL-MCNC: 245 MG/DL
HDLC SERPL-MCNC: 47 MG/DL
HDLC SERPL: 5.2 (ref 0–5)
LDLC SERPL CALC-MCNC: 167 MG/DL (ref 0–100)
TRIGL SERPL-MCNC: 155 MG/DL
VLDLC SERPL CALC-MCNC: 31 MG/DL

## 2023-10-17 NOTE — RESULT ENCOUNTER NOTE
The 10-year ASCVD risk score (Sun VARELA, et al., 2019) is: 8.7%  This is above 7.5%. Has follow up with Dr. Minoo Daily to review.

## 2023-12-15 ENCOUNTER — OFFICE VISIT (OUTPATIENT)
Age: 53
End: 2023-12-15

## 2023-12-15 VITALS
TEMPERATURE: 97.8 F | WEIGHT: 186.8 LBS | DIASTOLIC BLOOD PRESSURE: 85 MMHG | BODY MASS INDEX: 32.05 KG/M2 | HEART RATE: 64 BPM | OXYGEN SATURATION: 97 % | SYSTOLIC BLOOD PRESSURE: 133 MMHG

## 2023-12-15 DIAGNOSIS — M25.50 ARTHRALGIA, UNSPECIFIED JOINT: ICD-10-CM

## 2023-12-15 DIAGNOSIS — M54.2 NECK PAIN: ICD-10-CM

## 2023-12-15 DIAGNOSIS — Z23 ENCOUNTER FOR IMMUNIZATION: ICD-10-CM

## 2023-12-15 DIAGNOSIS — I10 PRIMARY HYPERTENSION: Primary | ICD-10-CM

## 2023-12-15 PROCEDURE — 99214 OFFICE O/P EST MOD 30 MIN: CPT | Performed by: FAMILY MEDICINE

## 2023-12-15 PROCEDURE — 3079F DIAST BP 80-89 MM HG: CPT | Performed by: FAMILY MEDICINE

## 2023-12-15 PROCEDURE — 3075F SYST BP GE 130 - 139MM HG: CPT | Performed by: FAMILY MEDICINE

## 2023-12-15 RX ORDER — CELECOXIB 200 MG/1
200 CAPSULE ORAL DAILY
Qty: 30 CAPSULE | Refills: 3 | Status: SHIPPED | OUTPATIENT
Start: 2023-12-15

## 2023-12-15 RX ORDER — LISINOPRIL 10 MG/1
10 TABLET ORAL DAILY
Qty: 90 TABLET | Refills: 1 | Status: SHIPPED | OUTPATIENT
Start: 2023-12-15

## 2023-12-15 SDOH — ECONOMIC STABILITY: INCOME INSECURITY: HOW HARD IS IT FOR YOU TO PAY FOR THE VERY BASICS LIKE FOOD, HOUSING, MEDICAL CARE, AND HEATING?: NOT HARD AT ALL

## 2023-12-15 SDOH — HEALTH STABILITY: MENTAL HEALTH: HOW MANY STANDARD DRINKS CONTAINING ALCOHOL DO YOU HAVE ON A TYPICAL DAY?: PATIENT DOES NOT DRINK

## 2023-12-15 SDOH — ECONOMIC STABILITY: INCOME INSECURITY: IN THE LAST 12 MONTHS, WAS THERE A TIME WHEN YOU WERE NOT ABLE TO PAY THE MORTGAGE OR RENT ON TIME?: NO

## 2023-12-15 SDOH — HEALTH STABILITY: MENTAL HEALTH: HOW OFTEN DO YOU HAVE A DRINK CONTAINING ALCOHOL?: NEVER

## 2023-12-15 SDOH — SOCIAL STABILITY: SOCIAL INSECURITY: WITHIN THE LAST YEAR, HAVE YOU BEEN HUMILIATED OR EMOTIONALLY ABUSED IN OTHER WAYS BY YOUR PARTNER OR EX-PARTNER?: NO

## 2023-12-15 SDOH — ECONOMIC STABILITY: FOOD INSECURITY: WITHIN THE PAST 12 MONTHS, YOU WORRIED THAT YOUR FOOD WOULD RUN OUT BEFORE YOU GOT MONEY TO BUY MORE.: NEVER TRUE

## 2023-12-15 SDOH — SOCIAL STABILITY: SOCIAL INSECURITY: WITHIN THE LAST YEAR, HAVE YOU BEEN AFRAID OF YOUR PARTNER OR EX-PARTNER?: NO

## 2023-12-15 SDOH — SOCIAL STABILITY: SOCIAL INSECURITY
WITHIN THE LAST YEAR, HAVE YOU BEEN KICKED, HIT, SLAPPED, OR OTHERWISE PHYSICALLY HURT BY YOUR PARTNER OR EX-PARTNER?: NO

## 2023-12-15 SDOH — ECONOMIC STABILITY: HOUSING INSECURITY: IN THE LAST 12 MONTHS, HOW MANY PLACES HAVE YOU LIVED?: 1

## 2023-12-15 SDOH — ECONOMIC STABILITY: FOOD INSECURITY: WITHIN THE PAST 12 MONTHS, THE FOOD YOU BOUGHT JUST DIDN'T LAST AND YOU DIDN'T HAVE MONEY TO GET MORE.: NEVER TRUE

## 2023-12-15 SDOH — SOCIAL STABILITY: SOCIAL INSECURITY
WITHIN THE LAST YEAR, HAVE TO BEEN RAPED OR FORCED TO HAVE ANY KIND OF SEXUAL ACTIVITY BY YOUR PARTNER OR EX-PARTNER?: NO

## 2023-12-15 ASSESSMENT — PATIENT HEALTH QUESTIONNAIRE - PHQ9
SUM OF ALL RESPONSES TO PHQ QUESTIONS 1-9: 4
2. FEELING DOWN, DEPRESSED OR HOPELESS: 2
DEPRESSION UNABLE TO ASSESS: FUNCTIONAL CAPACITY MOTIVATION LIMITS ACCURACY
1. LITTLE INTEREST OR PLEASURE IN DOING THINGS: 2
SUM OF ALL RESPONSES TO PHQ QUESTIONS 1-9: 4
SUM OF ALL RESPONSES TO PHQ9 QUESTIONS 1 & 2: 4

## 2023-12-15 NOTE — PROGRESS NOTES
Fuad Fernandez seen at d/c, full name and  verified. After Visit Summary provided and all discharge instructions reviewed. Pt to return for follow up appt in about 6 months for BP and in 1 week for lab review (virtual visit). Medication list and possible side effects reviewed. Teach back method utilized to ensure patient accurately understands how to and when to take each medication. GoodRx coupon provided and explained how to use. Patient requested flu vaccine today, explained to patient that he received the flu vaccine this season already on 10/16/23. Opportunity for questions provided, patient denies any questions.  Suzy Everett RN

## 2023-12-26 ENCOUNTER — TELEMEDICINE (OUTPATIENT)
Age: 53
End: 2023-12-26

## 2023-12-26 DIAGNOSIS — M25.50 ARTHRALGIA, UNSPECIFIED JOINT: ICD-10-CM

## 2023-12-26 DIAGNOSIS — E55.9 VITAMIN D DEFICIENCY: ICD-10-CM

## 2023-12-26 DIAGNOSIS — I10 PRIMARY HYPERTENSION: Primary | ICD-10-CM

## 2023-12-26 PROCEDURE — 99441 PR PHYS/QHP TELEPHONE EVALUATION 5-10 MIN: CPT | Performed by: FAMILY MEDICINE

## 2023-12-26 RX ORDER — ERGOCALCIFEROL 1.25 MG/1
50000 CAPSULE ORAL WEEKLY
Qty: 12 CAPSULE | Refills: 1 | Status: SHIPPED | OUTPATIENT
Start: 2023-12-26

## 2023-12-26 NOTE — PROGRESS NOTES
Fuad Farris (: 1970) is a 48 y.o. male, Established patient, Virtual Visit for evaluation of the following chief complaint(s):   Results (Lab results review ) and Hypertension (Pt. States he has not taken any of his mediations for the last 3 days, because he has been feeling well. States he will resume taking Lisinopril)       ASSESSMENT/PLAN:  1. Primary hypertension  Take Lisinopril daily. 2. Arthralgia, unspecified joint  OA with some Vitamin D deficiency. Continue with Celebrex once daily and add Vitamin D  3. Vitamin D deficiency  Start Vitamin D Rx    Return for follow up as scheduled. SUBJECTIVE/OBJECTIVE:  VV for follow up and lab review  HTN:  patient takes Lisinopril. Arthralgia: Aches improved with Celebrex. Aches in Rt neck, B shoulders, Rt hand, B knees (mild), Lt heel. Xray Rt hand 3/2022: degenerative irregularities of index and middle metacarpal heads, c/w arthritis. Fhx: Not known  Shx: No smoking, no alcohol    Review of Systems     Patient-Reported Vitals  BP Observations: No, remote/electronic monitoring device was not used or able to be verified  Patient-Reported Weight: 186 lb  Patient-Reported Height: 5'4     Physical Exam    On this date 2023 I have spent 8 minutes reviewing previous notes, test results and face to face (virtual) with the patient discussing the diagnosis and importance of compliance with the treatment plan as well as documenting on the day of the visit. Fuad Farris, was evaluated through a synchronous (real-time) audio-video encounter. The patient (or guardian if applicable) is aware that this is a billable service, which includes applicable co-pays. This Virtual Visit was conducted with patient's (and/or legal guardian's) consent. Patient identification was verified, and a caregiver was present when appropriate.    The patient was located at Home: [address] [place] [address]  Provider was located at [facility]

## 2023-12-26 NOTE — PROGRESS NOTES
Fuad Ibarra seen at d/c, full name and  verified. After Visit Summary provided and all discharge instructions reviewed. Pt to return for follow up appt in about 6 months for BP. Medication list and possible side effects reviewed. Teach back method utilized to ensure patient accurately understands how to and when to take each medication. GoodRx coupon provided and explained how to use. Opportunity for questions provided, patient denies any questions.  Azeem Carroll RN

## 2024-01-10 ENCOUNTER — TELEPHONE (OUTPATIENT)
Age: 54
End: 2024-01-10

## 2024-01-10 NOTE — TELEPHONE ENCOUNTER
Called patient to assist with medical bills. Patient's medical bills are from DOS 2022. CHW explained that a FA application cannot be completed for such an old debt. Patient verbalized understanding.

## 2024-05-08 ENCOUNTER — APPOINTMENT (OUTPATIENT)
Facility: HOSPITAL | Age: 54
End: 2024-05-08

## 2024-05-08 ENCOUNTER — HOSPITAL ENCOUNTER (EMERGENCY)
Facility: HOSPITAL | Age: 54
Discharge: HOME | End: 2024-05-08
Attending: EMERGENCY MEDICINE

## 2024-05-08 VITALS
OXYGEN SATURATION: 98 % | HEIGHT: 64 IN | RESPIRATION RATE: 18 BRPM | WEIGHT: 185 LBS | DIASTOLIC BLOOD PRESSURE: 76 MMHG | TEMPERATURE: 97.5 F | BODY MASS INDEX: 31.58 KG/M2 | HEART RATE: 87 BPM | SYSTOLIC BLOOD PRESSURE: 117 MMHG

## 2024-05-08 DIAGNOSIS — M54.50 CHRONIC LEFT-SIDED LOW BACK PAIN, UNSPECIFIED WHETHER SCIATICA PRESENT: ICD-10-CM

## 2024-05-08 DIAGNOSIS — G89.29 CHRONIC LEFT-SIDED LOW BACK PAIN, UNSPECIFIED WHETHER SCIATICA PRESENT: ICD-10-CM

## 2024-05-08 DIAGNOSIS — G89.29 CHRONIC PAIN OF LEFT KNEE: Primary | ICD-10-CM

## 2024-05-08 DIAGNOSIS — M25.562 CHRONIC PAIN OF LEFT KNEE: Primary | ICD-10-CM

## 2024-05-08 LAB
APPEARANCE UR: CLEAR
BACTERIA URNS QL MICRO: NEGATIVE /HPF
BILIRUB UR QL: NEGATIVE
COLOR UR: NORMAL
EPITH CASTS URNS QL MICRO: NORMAL /LPF
GLUCOSE UR STRIP.AUTO-MCNC: NEGATIVE MG/DL
HGB UR QL STRIP: NEGATIVE
HYALINE CASTS URNS QL MICRO: NORMAL /LPF (ref 0–2)
KETONES UR QL STRIP.AUTO: NEGATIVE MG/DL
LEUKOCYTE ESTERASE UR QL STRIP.AUTO: NEGATIVE
NITRITE UR QL STRIP.AUTO: NEGATIVE
PH UR STRIP: 5 (ref 5–8)
PROT UR STRIP-MCNC: NEGATIVE MG/DL
RBC #/AREA URNS HPF: NORMAL /HPF (ref 0–5)
SP GR UR REFRACTOMETRY: 1.02 (ref 1–1.03)
UROBILINOGEN UR QL STRIP.AUTO: 0.2 EU/DL (ref 0.2–1)
WBC URNS QL MICRO: NORMAL /HPF (ref 0–4)

## 2024-05-08 PROCEDURE — 6360000002 HC RX W HCPCS: Performed by: PHYSICIAN ASSISTANT

## 2024-05-08 PROCEDURE — 81001 URINALYSIS AUTO W/SCOPE: CPT

## 2024-05-08 PROCEDURE — 96372 THER/PROPH/DIAG INJ SC/IM: CPT

## 2024-05-08 PROCEDURE — 99284 EMERGENCY DEPT VISIT MOD MDM: CPT

## 2024-05-08 PROCEDURE — 73562 X-RAY EXAM OF KNEE 3: CPT

## 2024-05-08 PROCEDURE — 72100 X-RAY EXAM L-S SPINE 2/3 VWS: CPT

## 2024-05-08 RX ORDER — KETOROLAC TROMETHAMINE 30 MG/ML
30 INJECTION, SOLUTION INTRAMUSCULAR; INTRAVENOUS ONCE
Status: COMPLETED | OUTPATIENT
Start: 2024-05-08 | End: 2024-05-08

## 2024-05-08 RX ORDER — METHOCARBAMOL 750 MG/1
750 TABLET, FILM COATED ORAL 3 TIMES DAILY
Qty: 30 TABLET | Refills: 0 | Status: SHIPPED | OUTPATIENT
Start: 2024-05-08 | End: 2024-05-18

## 2024-05-08 RX ADMIN — KETOROLAC TROMETHAMINE 30 MG: 30 INJECTION, SOLUTION INTRAMUSCULAR at 10:10

## 2024-05-08 ASSESSMENT — PAIN SCALES - GENERAL
PAINLEVEL_OUTOF10: 9
PAINLEVEL_OUTOF10: 9

## 2024-05-08 ASSESSMENT — PAIN - FUNCTIONAL ASSESSMENT: PAIN_FUNCTIONAL_ASSESSMENT: 0-10

## 2024-05-08 ASSESSMENT — PAIN DESCRIPTION - LOCATION
LOCATION: KNEE;LEG
LOCATION: KNEE

## 2024-05-08 ASSESSMENT — PAIN DESCRIPTION - ORIENTATION: ORIENTATION: LEFT

## 2024-05-08 NOTE — ED TRIAGE NOTES
in use for triage.     Patient to ER for complaints of pain in bilateral knees. Patient reports pain in left foot that radiates to his backside. Patient reports pain started months ago.     Denies any known injury/trauma.     Patients states dysuria.     Last BM this morning.     Reports the pain got worse 2 weeks ago.     Patient has hx of HTN and arthritis in hands.     Provider in triage to assess patient.

## 2024-05-08 NOTE — ED PROVIDER NOTES
[facility] EMERGENCY DEPT  EMERGENCY DEPARTMENT ENCOUNTER      Pt Name: Fuad Luther  MRN: [id number]  Birthdate 1970  Date of evaluation: 5/8/2024  Provider: Pattie Mendez PA-C    CHIEF COMPLAINT       Chief Complaint   Patient presents with    Knee Pain    Back Pain         HISTORY OF PRESENT ILLNESS   (Location/Symptom, Timing/Onset, Context/Setting, Quality, Duration, Modifying Factors, Severity)  Note limiting factors.   The patient is a 53-year-old  male, he has a significant history for hypertension and arthritis, he states he was told he has arthritis in his hands, but not to his knees or his back.  He is seen by Jose, and has been given medication for his arthritis as well as his hypertension, but has not been taking this in the last 2 to 3 days.  He states he did not take them because he was waiting to see what our intervention and advisement would be.    He is here today, because he is having bilateral knee pain, left more than right over the past year, it has been worse in the last 2 weeks.  The patient works in construction.  He is also complaining of flank pain, in the right flank, which bothers him at nighttime, he also has some low back pain.  He does not have radiation of the pain into his buttocks or legs, but is complaining of tingling, sharp pain in the area of the knee and in the left heel.  He denies any weakness, loss of control of bowel or bladder, no dysuria, frequency or urgency or hematuria.  States his urine is dark yellow.  He denies any fever, nausea or vomiting.    The history is provided by the patient. A  was used.         Review of External Medical Records:     Nursing Notes were reviewed.    REVIEW OF SYSTEMS    (2-9 systems for level 4, 10 or more for level 5)     Review of Systems    Except as noted above the remainder of the review of systems was reviewed and negative.       PAST MEDICAL HISTORY   No past medical history on  completed with a voice recognition program.  Efforts were made to edit the dictations but occasionally words are mis-transcribed.)    Pattie Mendez PA-C (electronically signed)  Emergency Attending Physician / Physician Assistant / Nurse Practitioner

## 2024-05-09 ENCOUNTER — OFFICE VISIT (OUTPATIENT)
Age: 54
End: 2024-05-09

## 2024-05-09 ENCOUNTER — TELEPHONE (OUTPATIENT)
Age: 54
End: 2024-05-09

## 2024-05-09 DIAGNOSIS — Z71.89 COUNSELING AND COORDINATION OF CARE: Primary | ICD-10-CM

## 2024-05-09 PROCEDURE — 99080 SPECIAL REPORTS OR FORMS: CPT | Performed by: PHYSICIAN ASSISTANT

## 2024-05-09 NOTE — PROGRESS NOTES
BS Financial Assistance application has been completed.   Patient will bring it to a Financial Counselor at the hospital.    Patient has been instructed to complete [employer]) screening. DOS was yesterday, he has not received [employer] card yet.

## 2024-06-10 ENCOUNTER — OFFICE VISIT (OUTPATIENT)
Age: 54
End: 2024-06-10

## 2024-06-10 DIAGNOSIS — Z71.89 COUNSELING AND COORDINATION OF CARE: Primary | ICD-10-CM

## 2024-06-10 NOTE — PROGRESS NOTES
Patient came as a walk in for information about his BS financial assistance. ORW has informed patient he has been approved until 12/31/24. ORW has printed copies of approval letter for patient and have instructed about next steps when he receives bills from the hospitals. Patient has verbalized understanding.

## 2024-07-05 ENCOUNTER — OFFICE VISIT (OUTPATIENT)
Age: 54
End: 2024-07-05

## 2024-07-05 VITALS
BODY MASS INDEX: 31.58 KG/M2 | HEART RATE: 80 BPM | TEMPERATURE: 99.2 F | WEIGHT: 184 LBS | OXYGEN SATURATION: 96 % | SYSTOLIC BLOOD PRESSURE: 132 MMHG | DIASTOLIC BLOOD PRESSURE: 82 MMHG

## 2024-07-05 DIAGNOSIS — E55.9 VITAMIN D DEFICIENCY: ICD-10-CM

## 2024-07-05 DIAGNOSIS — Z71.89 COUNSELING AND COORDINATION OF CARE: Primary | ICD-10-CM

## 2024-07-05 DIAGNOSIS — M25.50 ARTHRALGIA, UNSPECIFIED JOINT: ICD-10-CM

## 2024-07-05 DIAGNOSIS — I10 PRIMARY HYPERTENSION: Primary | ICD-10-CM

## 2024-07-05 PROCEDURE — 99214 OFFICE O/P EST MOD 30 MIN: CPT | Performed by: FAMILY MEDICINE

## 2024-07-05 PROCEDURE — 3075F SYST BP GE 130 - 139MM HG: CPT | Performed by: FAMILY MEDICINE

## 2024-07-05 PROCEDURE — 3078F DIAST BP <80 MM HG: CPT | Performed by: FAMILY MEDICINE

## 2024-07-05 RX ORDER — AMLODIPINE BESYLATE 5 MG/1
5 TABLET ORAL DAILY
Qty: 90 TABLET | Refills: 1 | Status: SHIPPED | OUTPATIENT
Start: 2024-07-05

## 2024-07-05 RX ORDER — ERGOCALCIFEROL 1.25 MG/1
50000 CAPSULE ORAL WEEKLY
Qty: 12 CAPSULE | Refills: 1 | Status: SHIPPED | OUTPATIENT
Start: 2024-07-05

## 2024-07-05 RX ORDER — CELECOXIB 200 MG/1
200 CAPSULE ORAL DAILY
Qty: 30 CAPSULE | Refills: 3 | Status: SHIPPED | OUTPATIENT
Start: 2024-07-05

## 2024-07-05 SDOH — ECONOMIC STABILITY: FOOD INSECURITY: WITHIN THE PAST 12 MONTHS, THE FOOD YOU BOUGHT JUST DIDN'T LAST AND YOU DIDN'T HAVE MONEY TO GET MORE.: NEVER TRUE

## 2024-07-05 SDOH — ECONOMIC STABILITY: INCOME INSECURITY: HOW HARD IS IT FOR YOU TO PAY FOR THE VERY BASICS LIKE FOOD, HOUSING, MEDICAL CARE, AND HEATING?: NOT HARD AT ALL

## 2024-07-05 SDOH — ECONOMIC STABILITY: FOOD INSECURITY: WITHIN THE PAST 12 MONTHS, YOU WORRIED THAT YOUR FOOD WOULD RUN OUT BEFORE YOU GOT MONEY TO BUY MORE.: NEVER TRUE

## 2024-07-05 ASSESSMENT — PATIENT HEALTH QUESTIONNAIRE - PHQ9
SUM OF ALL RESPONSES TO PHQ QUESTIONS 1-9: 6
1. LITTLE INTEREST OR PLEASURE IN DOING THINGS: NEARLY EVERY DAY
SUM OF ALL RESPONSES TO PHQ9 QUESTIONS 1 & 2: 6
SUM OF ALL RESPONSES TO PHQ QUESTIONS 1-9: 6
2. FEELING DOWN, DEPRESSED OR HOPELESS: NEARLY EVERY DAY

## 2024-07-05 ASSESSMENT — ENCOUNTER SYMPTOMS
SHORTNESS OF BREATH: 0
CHEST TIGHTNESS: 0
COUGH: 0
TROUBLE SWALLOWING: 0

## 2024-07-05 NOTE — PROGRESS NOTES
Due to language barrier, an  (Josh on site) was present during the history-taking and subsequent discussion with this patient.   \"Have you been to the ER, urgent care clinic since your last visit?  Hospitalized since your last visit?\"    NO    “Have you seen or consulted any other health care providers outside of [facility] since your last visit?”    NO            Click Here for Release of Records Request   
Pt's name and  verified. AVS provided. Medication reviewed and education provided. Good Rx and Single Care coupons given to patient as well as instructions on how to use at the pharmacy.  Patient advised to make a 3 month follow up visit per provider. Due to language barriers Josh,  assisted. Time allowed for questions, no questions at this time. Peyton Oliveira LPN   
light-headedness and headaches.     OBJECTIVE:  Blood pressure 132/82, pulse 80, temperature 99.2 °F (37.3 °C), temperature source Temporal, weight 83.5 kg (184 lb), SpO2 96 %.  Physical Exam  CONSTITUTIONAL:  Well developed.  No apparent distress.  PSYCHIATRIC: Oriented to time, place, person & situation.  Appropriate mood and affect.  HEENT:  Sclera clear.  Mouth/lips without swelling or tenderness.    NECK:  Normal inspection, normal palpation without any lymphadenopathy, masses, or thyromegaly  CARDIOVASCULAR:  Regular rate and rhythm.  Normal S1, S2.  No extra sounds.  RESPIRATORY:  Normal effort.  Normal ascultation without wheezing.  EXTREMITIES:  No edema.   MUSCULOSKELETAL:  Tender along B posterior neck, B paraspinal thoracic muscles, midline lumbar spine.  Rt hand with swelling of index/mid MCP.  No swelling, warmth of wrists or ankles.  Lt hand without deformity or synovitis.    No results found for any visits on 07/05/24.       An electronic signature was used to authenticate this note.  -- Gallo Harris MD

## 2024-07-05 NOTE — PROGRESS NOTES
Patient was a walk in for assistance with medical bills. ORW has printed the BS FA approval letter and sent a copy with medical bill and has provided patient with additional copies and has instructed patient about next steps to take if he gets any other bills. Patient has verbalized understanding.

## 2024-09-20 ENCOUNTER — APPOINTMENT (OUTPATIENT)
Facility: HOSPITAL | Age: 54
End: 2024-09-20

## 2024-09-20 ENCOUNTER — HOSPITAL ENCOUNTER (EMERGENCY)
Facility: HOSPITAL | Age: 54
Discharge: HOME | End: 2024-09-20
Attending: STUDENT IN AN ORGANIZED HEALTH CARE EDUCATION/TRAINING PROGRAM

## 2024-09-20 VITALS
BODY MASS INDEX: 31.71 KG/M2 | TEMPERATURE: 97.4 F | WEIGHT: 184.75 LBS | DIASTOLIC BLOOD PRESSURE: 80 MMHG | SYSTOLIC BLOOD PRESSURE: 122 MMHG | RESPIRATION RATE: 16 BRPM | HEART RATE: 73 BPM | OXYGEN SATURATION: 92 %

## 2024-09-20 DIAGNOSIS — R22.0 LIP SWELLING: ICD-10-CM

## 2024-09-20 DIAGNOSIS — R10.11 ABDOMINAL PAIN, RIGHT UPPER QUADRANT: ICD-10-CM

## 2024-09-20 DIAGNOSIS — L29.9 PRURITIC DISORDER: Primary | ICD-10-CM

## 2024-09-20 LAB
ALBUMIN SERPL-MCNC: 3.6 G/DL (ref 3.5–5)
ALBUMIN/GLOB SERPL: 0.9 (ref 1.1–2.2)
ALP SERPL-CCNC: 103 U/L (ref 45–117)
ALT SERPL-CCNC: 29 U/L (ref 12–78)
ANION GAP SERPL CALC-SCNC: 4 MMOL/L (ref 2–12)
AST SERPL-CCNC: 23 U/L (ref 15–37)
BASOPHILS # BLD: 0 K/UL (ref 0–0.1)
BASOPHILS NFR BLD: 1 % (ref 0–1)
BILIRUB SERPL-MCNC: 0.3 MG/DL (ref 0.2–1)
BUN SERPL-MCNC: 12 MG/DL (ref 6–20)
BUN/CREAT SERPL: 16 (ref 12–20)
CALCIUM SERPL-MCNC: 8.6 MG/DL (ref 8.5–10.1)
CHLORIDE SERPL-SCNC: 109 MMOL/L (ref 97–108)
CO2 SERPL-SCNC: 24 MMOL/L (ref 21–32)
CREAT SERPL-MCNC: 0.76 MG/DL (ref 0.7–1.3)
DIFFERENTIAL METHOD BLD: NORMAL
EOSINOPHIL # BLD: 0.1 K/UL (ref 0–0.4)
EOSINOPHIL NFR BLD: 1 % (ref 0–7)
ERYTHROCYTE [DISTWIDTH] IN BLOOD BY AUTOMATED COUNT: 12.8 % (ref 11.5–14.5)
GLOBULIN SER CALC-MCNC: 4 G/DL (ref 2–4)
GLUCOSE SERPL-MCNC: 106 MG/DL (ref 65–100)
HCT VFR BLD AUTO: 41.6 % (ref 36.6–50.3)
HGB BLD-MCNC: 14 G/DL (ref 12.1–17)
IMM GRANULOCYTES # BLD AUTO: 0 K/UL (ref 0–0.04)
IMM GRANULOCYTES NFR BLD AUTO: 0 % (ref 0–0.5)
LIPASE SERPL-CCNC: 24 U/L (ref 13–75)
LYMPHOCYTES # BLD: 1.9 K/UL (ref 0.8–3.5)
LYMPHOCYTES NFR BLD: 24 % (ref 12–49)
MCH RBC QN AUTO: 30.4 PG (ref 26–34)
MCHC RBC AUTO-ENTMCNC: 33.7 G/DL (ref 30–36.5)
MCV RBC AUTO: 90.2 FL (ref 80–99)
MONOCYTES # BLD: 0.5 K/UL (ref 0–1)
MONOCYTES NFR BLD: 6 % (ref 5–13)
NEUTS SEG # BLD: 5.5 K/UL (ref 1.8–8)
NEUTS SEG NFR BLD: 68 % (ref 32–75)
NRBC # BLD: 0 K/UL (ref 0–0.01)
NRBC BLD-RTO: 0 PER 100 WBC
PLATELET # BLD AUTO: 316 K/UL (ref 150–400)
PMV BLD AUTO: 9.4 FL (ref 8.9–12.9)
POTASSIUM SERPL-SCNC: 4 MMOL/L (ref 3.5–5.1)
PROT SERPL-MCNC: 7.6 G/DL (ref 6.4–8.2)
RBC # BLD AUTO: 4.61 M/UL (ref 4.1–5.7)
SODIUM SERPL-SCNC: 137 MMOL/L (ref 136–145)
WBC # BLD AUTO: 8 K/UL (ref 4.1–11.1)

## 2024-09-20 PROCEDURE — 36415 COLL VENOUS BLD VENIPUNCTURE: CPT

## 2024-09-20 PROCEDURE — 6370000000 HC RX 637 (ALT 250 FOR IP): Performed by: STUDENT IN AN ORGANIZED HEALTH CARE EDUCATION/TRAINING PROGRAM

## 2024-09-20 PROCEDURE — 99284 EMERGENCY DEPT VISIT MOD MDM: CPT

## 2024-09-20 PROCEDURE — 85025 COMPLETE CBC W/AUTO DIFF WBC: CPT

## 2024-09-20 PROCEDURE — 83690 ASSAY OF LIPASE: CPT

## 2024-09-20 PROCEDURE — 94761 N-INVAS EAR/PLS OXIMETRY MLT: CPT

## 2024-09-20 PROCEDURE — 76705 ECHO EXAM OF ABDOMEN: CPT

## 2024-09-20 PROCEDURE — 80053 COMPREHEN METABOLIC PANEL: CPT

## 2024-09-20 RX ORDER — DIPHENHYDRAMINE HCL 25 MG
50 TABLET ORAL EVERY 6 HOURS PRN
Qty: 32 TABLET | Refills: 0 | Status: SHIPPED | OUTPATIENT
Start: 2024-09-20 | End: 2024-09-24

## 2024-09-20 RX ORDER — PREDNISONE 5 MG/1
TABLET ORAL
Qty: 21 EACH | Refills: 0 | Status: SHIPPED | OUTPATIENT
Start: 2024-09-20

## 2024-09-20 RX ORDER — FAMOTIDINE 20 MG/1
20 TABLET, FILM COATED ORAL ONCE
Status: COMPLETED | OUTPATIENT
Start: 2024-09-20 | End: 2024-09-20

## 2024-09-20 RX ADMIN — PREDNISONE 50 MG: 5 TABLET ORAL at 09:21

## 2024-09-20 RX ADMIN — FAMOTIDINE 20 MG: 20 TABLET, FILM COATED ORAL at 09:21

## 2024-09-20 ASSESSMENT — ENCOUNTER SYMPTOMS
FACIAL SWELLING: 1
ROS SKIN COMMENTS: + PRURITUS
ABDOMINAL PAIN: 1

## 2024-09-20 ASSESSMENT — PAIN - FUNCTIONAL ASSESSMENT: PAIN_FUNCTIONAL_ASSESSMENT: 0-10

## 2024-09-20 ASSESSMENT — PAIN DESCRIPTION - DESCRIPTORS: DESCRIPTORS: PRESSURE

## 2024-09-20 ASSESSMENT — PAIN DESCRIPTION - LOCATION: LOCATION: ABDOMEN

## 2024-09-20 ASSESSMENT — PAIN SCALES - GENERAL: PAINLEVEL_OUTOF10: 8

## 2024-09-23 ENCOUNTER — TELEPHONE (OUTPATIENT)
Age: 54
End: 2024-09-23

## 2024-11-08 ENCOUNTER — OFFICE VISIT (OUTPATIENT)
Age: 54
End: 2024-11-08

## 2024-11-08 VITALS
BODY MASS INDEX: 31.24 KG/M2 | TEMPERATURE: 97.5 F | SYSTOLIC BLOOD PRESSURE: 127 MMHG | WEIGHT: 182 LBS | HEART RATE: 78 BPM | OXYGEN SATURATION: 94 % | DIASTOLIC BLOOD PRESSURE: 77 MMHG

## 2024-11-08 DIAGNOSIS — T78.40XD ALLERGIC REACTION, SUBSEQUENT ENCOUNTER: ICD-10-CM

## 2024-11-08 DIAGNOSIS — B35.2 TINEA MANUUM: ICD-10-CM

## 2024-11-08 DIAGNOSIS — M54.2 NECK PAIN: ICD-10-CM

## 2024-11-08 DIAGNOSIS — I10 PRIMARY HYPERTENSION: Primary | ICD-10-CM

## 2024-11-08 DIAGNOSIS — K21.00 GASTROESOPHAGEAL REFLUX DISEASE WITH ESOPHAGITIS WITHOUT HEMORRHAGE: ICD-10-CM

## 2024-11-08 PROCEDURE — 99214 OFFICE O/P EST MOD 30 MIN: CPT | Performed by: FAMILY MEDICINE

## 2024-11-08 PROCEDURE — 3074F SYST BP LT 130 MM HG: CPT | Performed by: FAMILY MEDICINE

## 2024-11-08 PROCEDURE — 3078F DIAST BP <80 MM HG: CPT | Performed by: FAMILY MEDICINE

## 2024-11-08 RX ORDER — CELECOXIB 200 MG/1
200 CAPSULE ORAL DAILY
Qty: 30 CAPSULE | Refills: 3 | Status: SHIPPED | OUTPATIENT
Start: 2024-11-08

## 2024-11-08 RX ORDER — TERBINAFINE HYDROCHLORIDE 250 MG/1
250 TABLET ORAL DAILY
Qty: 14 TABLET | Refills: 0 | Status: SHIPPED | OUTPATIENT
Start: 2024-11-08 | End: 2024-11-22

## 2024-11-08 RX ORDER — AMLODIPINE BESYLATE 5 MG/1
5 TABLET ORAL DAILY
Qty: 90 TABLET | Refills: 1 | Status: SHIPPED | OUTPATIENT
Start: 2024-11-08

## 2024-11-08 RX ORDER — CYCLOBENZAPRINE HCL 10 MG
10 TABLET ORAL NIGHTLY PRN
Qty: 30 TABLET | Refills: 1 | Status: SHIPPED | OUTPATIENT
Start: 2024-11-08

## 2024-11-08 RX ORDER — ERGOCALCIFEROL 1.25 MG/1
50000 CAPSULE, LIQUID FILLED ORAL WEEKLY
Qty: 12 CAPSULE | Refills: 1 | Status: SHIPPED | OUTPATIENT
Start: 2024-11-08

## 2024-11-08 SDOH — HEALTH STABILITY: MENTAL HEALTH
STRESS IS WHEN SOMEONE FEELS TENSE, NERVOUS, ANXIOUS, OR CAN'T SLEEP AT NIGHT BECAUSE THEIR MIND IS TROUBLED. HOW STRESSED ARE YOU?: NOT AT ALL

## 2024-11-08 SDOH — SOCIAL STABILITY: SOCIAL NETWORK: HOW OFTEN DO YOU ATTEND CHURCH OR RELIGIOUS SERVICES?: 1 TO 4 TIMES PER YEAR

## 2024-11-08 SDOH — SOCIAL STABILITY: SOCIAL NETWORK
DO YOU BELONG TO ANY CLUBS OR ORGANIZATIONS SUCH AS CHURCH GROUPS UNIONS, FRATERNAL OR ATHLETIC GROUPS, OR SCHOOL GROUPS?: NO

## 2024-11-08 SDOH — SOCIAL STABILITY: SOCIAL NETWORK: HOW OFTEN DO YOU ATTENT MEETINGS OF THE CLUB OR ORGANIZATION YOU BELONG TO?: NEVER

## 2024-11-08 SDOH — SOCIAL STABILITY: SOCIAL NETWORK: ARE YOU MARRIED, WIDOWED, DIVORCED, SEPARATED, NEVER MARRIED, OR LIVING WITH A PARTNER?: MARRIED

## 2024-11-08 SDOH — HEALTH STABILITY: PHYSICAL HEALTH: ON AVERAGE, HOW MANY DAYS PER WEEK DO YOU ENGAGE IN MODERATE TO STRENUOUS EXERCISE (LIKE A BRISK WALK)?: 0 DAYS

## 2024-11-08 SDOH — SOCIAL STABILITY: SOCIAL NETWORK: IN A TYPICAL WEEK, HOW MANY TIMES DO YOU TALK ON THE PHONE WITH FAMILY, FRIENDS, OR NEIGHBORS?: ONCE A WEEK

## 2024-11-08 SDOH — HEALTH STABILITY: PHYSICAL HEALTH: ON AVERAGE, HOW MANY MINUTES DO YOU ENGAGE IN EXERCISE AT THIS LEVEL?: 0 MIN

## 2024-11-08 SDOH — SOCIAL STABILITY: SOCIAL NETWORK: HOW OFTEN DO YOU GET TOGETHER WITH FRIENDS OR RELATIVES?: ONCE A WEEK

## 2024-11-08 ASSESSMENT — PATIENT HEALTH QUESTIONNAIRE - PHQ9
SUM OF ALL RESPONSES TO PHQ QUESTIONS 1-9: 6
2. FEELING DOWN, DEPRESSED OR HOPELESS: NEARLY EVERY DAY
SUM OF ALL RESPONSES TO PHQ QUESTIONS 1-9: 6
SUM OF ALL RESPONSES TO PHQ9 QUESTIONS 1 & 2: 6
SUM OF ALL RESPONSES TO PHQ QUESTIONS 1-9: 6
SUM OF ALL RESPONSES TO PHQ QUESTIONS 1-9: 6
1. LITTLE INTEREST OR PLEASURE IN DOING THINGS: NEARLY EVERY DAY

## 2024-11-08 NOTE — PROGRESS NOTES
\"Have you been to the ER, urgent care clinic since your last visit?  Hospitalized since your last visit?\"    NO    “Have you seen or consulted any other health care providers outside our system since your last visit?”    NO      “Have you had a colorectal cancer screening such as a colonoscopy/FIT/Cologuard?    NO    Date of last Colonoscopy: 9/5/2014  No cologuard on file  No FIT/FOBT on file   No flexible sigmoidoscopy on file

## 2024-11-08 NOTE — PROGRESS NOTES
Pt's name and  verified. AVS provided. Medication reviewed and education provided. Good Rx coupons given to patient as well as instructions on how to use at the pharmacy. Patient instructed to make a 6 month follow up per provider. Due to language barrier  Olamide assisted. Time allowed for questions, no questions at this time. Peyton Oliveira LPN

## 2024-11-08 NOTE — PROGRESS NOTES
Fuad Luther (: 1970) is a 54 y.o. male, Established patient, here for evaluation of the following chief complaint(s):  Hypertension (3 month f/u) and Joint Pain (3 month f/u)       ASSESSMENT/PLAN:  1. Primary hypertension  Controlled, continue current medication  2. Gastroesophageal reflux disease with esophagitis without hemorrhage  Has recurrent GERD and uses Prilosec PRN.  Would like it renewed.  -     omeprazole (PRILOSEC) 20 MG delayed release capsule; Take 1 capsule by mouth Daily, Disp-30 capsule, R-2Normal  3. Allergic reaction, subsequent encounter  Uncertain trigger.  He feels it has occurred less since stopping ACE.  Will continue with Norvasc for now.  He will monitor foods or other potential triggers.  4. Neck pain  Trial of Flexeril.  Continue with Celebrex  5. Tinea manuum  Tx with Lamisil PO    Return for follow up F2F in 6 months for BP.    SUBJECTIVE:  Follow up for BP check  HTN:  patient is taking Norvasc regularly.  Facial Swelling:  Pt with spells of perioral pruritus followed by swelling around lip and/or face at times.  Sometimes Lt or Rt face.   It had occurred multiple times and was becoming more frequent prior to 2024 visit when his ACE was discontinued.  He had another spell 2024 and went to ED.  Hand Rash:  5-6 months of rash on palm of Lt hand.  Pruritic at times.  Joint Pain: Pt with joint pain and LBP dating back to .  Has aches in neck, B shoulders, Rt hand, B knees (mild).  Sometimes has burning pain in B mid back.   Improved with Celebrex but returned when finished.  Neck pain is causing more problems recently.  Has neck pain dating back to .  Pain is worse with certain positions and radiates to behind Lt ear and into Lt shoulder.    Xray Rt hand 3/2022: degenerative irregularities of index and middle metacarpal heads, c/w arthritis.  Seen in ED 24: presented with knee pain.  Lt knee xray: small osteophytes along patellar margins.  Lumbar

## 2025-07-17 ENCOUNTER — HOSPITAL ENCOUNTER (OUTPATIENT)
Facility: HOSPITAL | Age: 55
Setting detail: SPECIMEN
Discharge: HOME | End: 2025-07-20

## 2025-07-17 DIAGNOSIS — M54.41 CHRONIC MIDLINE LOW BACK PAIN WITH BILATERAL SCIATICA: ICD-10-CM

## 2025-07-17 DIAGNOSIS — G89.29 CHRONIC MIDLINE LOW BACK PAIN WITH BILATERAL SCIATICA: ICD-10-CM

## 2025-07-17 DIAGNOSIS — R20.0 BILATERAL LEG NUMBNESS: ICD-10-CM

## 2025-07-17 DIAGNOSIS — M54.42 CHRONIC MIDLINE LOW BACK PAIN WITH BILATERAL SCIATICA: ICD-10-CM

## 2025-07-17 DIAGNOSIS — M25.50 ARTHRALGIA, UNSPECIFIED JOINT: ICD-10-CM

## 2025-07-17 DIAGNOSIS — E55.9 VITAMIN D DEFICIENCY: ICD-10-CM

## 2025-07-17 PROCEDURE — 80053 COMPREHEN METABOLIC PANEL: CPT

## 2025-07-17 PROCEDURE — 83036 HEMOGLOBIN GLYCOSYLATED A1C: CPT

## 2025-07-17 PROCEDURE — 81001 URINALYSIS AUTO W/SCOPE: CPT

## 2025-07-17 PROCEDURE — 85652 RBC SED RATE AUTOMATED: CPT

## 2025-07-17 PROCEDURE — 82607 VITAMIN B-12: CPT

## 2025-07-17 PROCEDURE — 82306 VITAMIN D 25 HYDROXY: CPT

## 2025-07-17 PROCEDURE — 82746 ASSAY OF FOLIC ACID SERUM: CPT

## 2025-07-17 PROCEDURE — 85025 COMPLETE CBC W/AUTO DIFF WBC: CPT

## 2025-07-17 PROCEDURE — 36415 COLL VENOUS BLD VENIPUNCTURE: CPT

## 2025-07-17 PROCEDURE — 84443 ASSAY THYROID STIM HORMONE: CPT

## 2025-07-18 LAB
ALBUMIN SERPL-MCNC: 3.8 G/DL (ref 3.5–5)
ALBUMIN/GLOB SERPL: 1.1 (ref 1.1–2.2)
ALP SERPL-CCNC: 104 U/L (ref 45–117)
ALT SERPL-CCNC: 23 U/L (ref 12–78)
ANION GAP SERPL CALC-SCNC: 5 MMOL/L (ref 2–12)
APPEARANCE UR: CLEAR
AST SERPL-CCNC: 13 U/L (ref 15–37)
BACTERIA URNS QL MICRO: NEGATIVE /HPF
BASOPHILS # BLD: 0.03 K/UL (ref 0–0.1)
BASOPHILS NFR BLD: 0.5 % (ref 0–1)
BILIRUB SERPL-MCNC: 0.3 MG/DL (ref 0.2–1)
BILIRUB UR QL: NEGATIVE
BUN SERPL-MCNC: 17 MG/DL (ref 6–20)
BUN/CREAT SERPL: 20 (ref 12–20)
CALCIUM SERPL-MCNC: 8.6 MG/DL (ref 8.5–10.1)
CHLORIDE SERPL-SCNC: 110 MMOL/L (ref 97–108)
CO2 SERPL-SCNC: 25 MMOL/L (ref 21–32)
COLOR UR: NORMAL
CREAT SERPL-MCNC: 0.87 MG/DL (ref 0.7–1.3)
DIFFERENTIAL METHOD BLD: NORMAL
EOSINOPHIL # BLD: 0.11 K/UL (ref 0–0.4)
EOSINOPHIL NFR BLD: 1.7 % (ref 0–7)
EPITH CASTS URNS QL MICRO: NORMAL /LPF
ERYTHROCYTE [DISTWIDTH] IN BLOOD BY AUTOMATED COUNT: 13 % (ref 11.5–14.5)
ERYTHROCYTE [SEDIMENTATION RATE] IN BLOOD: 12 MM/HR (ref 0–20)
FOLATE SERPL-MCNC: 20.9 NG/ML (ref 5–21)
GLOBULIN SER CALC-MCNC: 3.5 G/DL (ref 2–4)
GLUCOSE SERPL-MCNC: 96 MG/DL (ref 65–100)
GLUCOSE UR STRIP.AUTO-MCNC: NEGATIVE MG/DL
HCT VFR BLD AUTO: 42 % (ref 36.6–50.3)
HGB BLD-MCNC: 13.2 G/DL (ref 12.1–17)
HGB UR QL STRIP: NEGATIVE
HYALINE CASTS URNS QL MICRO: NORMAL /LPF (ref 0–5)
IMM GRANULOCYTES # BLD AUTO: 0.01 K/UL (ref 0–0.04)
IMM GRANULOCYTES NFR BLD AUTO: 0.2 % (ref 0–0.5)
KETONES UR QL STRIP.AUTO: NEGATIVE MG/DL
LEUKOCYTE ESTERASE UR QL STRIP.AUTO: NEGATIVE
LYMPHOCYTES # BLD: 1.82 K/UL (ref 0.8–3.5)
LYMPHOCYTES NFR BLD: 27.8 % (ref 12–49)
MCH RBC QN AUTO: 29.7 PG (ref 26–34)
MCHC RBC AUTO-ENTMCNC: 31.4 G/DL (ref 30–36.5)
MCV RBC AUTO: 94.6 FL (ref 80–99)
MONOCYTES # BLD: 0.41 K/UL (ref 0–1)
MONOCYTES NFR BLD: 6.3 % (ref 5–13)
NEUTS SEG # BLD: 4.16 K/UL (ref 1.8–8)
NEUTS SEG NFR BLD: 63.5 % (ref 32–75)
NITRITE UR QL STRIP.AUTO: NEGATIVE
NRBC # BLD: 0 K/UL (ref 0–0.01)
NRBC BLD-RTO: 0 PER 100 WBC
PH UR STRIP: 5.5 (ref 5–8)
PLATELET # BLD AUTO: 278 K/UL (ref 150–400)
PMV BLD AUTO: 10.4 FL (ref 8.9–12.9)
POTASSIUM SERPL-SCNC: 3.9 MMOL/L (ref 3.5–5.1)
PROT SERPL-MCNC: 7.3 G/DL (ref 6.4–8.2)
PROT UR STRIP-MCNC: NEGATIVE MG/DL
RBC # BLD AUTO: 4.44 M/UL (ref 4.1–5.7)
RBC #/AREA URNS HPF: NORMAL /HPF (ref 0–5)
SODIUM SERPL-SCNC: 140 MMOL/L (ref 136–145)
SP GR UR REFRACTOMETRY: 1.02 (ref 1–1.03)
SPECIMEN HOLD: NORMAL
TSH SERPL DL<=0.05 MIU/L-ACNC: 1.14 UIU/ML (ref 0.36–3.74)
UROBILINOGEN UR QL STRIP.AUTO: 0.2 EU/DL (ref 0.2–1)
VIT B12 SERPL-MCNC: 421 PG/ML (ref 193–986)
WBC # BLD AUTO: 6.5 K/UL (ref 4.1–11.1)
WBC URNS QL MICRO: NORMAL /HPF (ref 0–4)

## 2025-07-20 LAB
25(OH)D3 SERPL-MCNC: 23 NG/ML (ref 30–100)
EST. AVERAGE GLUCOSE BLD GHB EST-MCNC: 111 MG/DL
HBA1C MFR BLD: 5.5 % (ref 4–5.6)

## 2025-07-30 ENCOUNTER — HOSPITAL ENCOUNTER (OUTPATIENT)
Facility: HOSPITAL | Age: 55
Discharge: HOME | End: 2025-08-02
Attending: FAMILY MEDICINE

## 2025-07-30 VITALS — BODY MASS INDEX: 29.87 KG/M2 | WEIGHT: 174 LBS

## 2025-07-30 DIAGNOSIS — M54.41 CHRONIC MIDLINE LOW BACK PAIN WITH BILATERAL SCIATICA: ICD-10-CM

## 2025-07-30 DIAGNOSIS — G89.29 CHRONIC MIDLINE LOW BACK PAIN WITH BILATERAL SCIATICA: ICD-10-CM

## 2025-07-30 DIAGNOSIS — R20.0 BILATERAL LEG NUMBNESS: ICD-10-CM

## 2025-07-30 DIAGNOSIS — M54.42 CHRONIC MIDLINE LOW BACK PAIN WITH BILATERAL SCIATICA: ICD-10-CM

## 2025-07-30 PROCEDURE — 72148 MRI LUMBAR SPINE W/O DYE: CPT

## 2025-07-30 NOTE — CONSULTS
Session ID: [id number]  Session Duration: 8 minutes  Language: Kiswahili   ID: #[id number]   Name: Paul Vásquez

## 2025-08-04 ENCOUNTER — TELEMEDICINE (OUTPATIENT)
Age: 55
End: 2025-08-04

## 2025-08-04 DIAGNOSIS — M54.42 CHRONIC MIDLINE LOW BACK PAIN WITH BILATERAL SCIATICA: Primary | ICD-10-CM

## 2025-08-04 DIAGNOSIS — G89.29 CHRONIC MIDLINE LOW BACK PAIN WITH BILATERAL SCIATICA: Primary | ICD-10-CM

## 2025-08-04 DIAGNOSIS — M25.50 ARTHRALGIA, UNSPECIFIED JOINT: ICD-10-CM

## 2025-08-04 DIAGNOSIS — M54.41 CHRONIC MIDLINE LOW BACK PAIN WITH BILATERAL SCIATICA: Primary | ICD-10-CM

## 2025-08-04 DIAGNOSIS — E55.9 VITAMIN D DEFICIENCY: ICD-10-CM

## 2025-08-04 DIAGNOSIS — N52.9 ERECTILE DYSFUNCTION, UNSPECIFIED ERECTILE DYSFUNCTION TYPE: ICD-10-CM

## 2025-08-04 PROCEDURE — 99214 OFFICE O/P EST MOD 30 MIN: CPT | Performed by: FAMILY MEDICINE

## 2025-08-04 RX ORDER — TADALAFIL 5 MG/1
5 TABLET ORAL DAILY
Qty: 30 TABLET | Refills: 2 | Status: SHIPPED | OUTPATIENT
Start: 2025-08-04

## 2025-08-04 SDOH — ECONOMIC STABILITY: FOOD INSECURITY: WITHIN THE PAST 12 MONTHS, YOU WORRIED THAT YOUR FOOD WOULD RUN OUT BEFORE YOU GOT MONEY TO BUY MORE.: NEVER TRUE

## 2025-08-04 SDOH — ECONOMIC STABILITY: FOOD INSECURITY: WITHIN THE PAST 12 MONTHS, THE FOOD YOU BOUGHT JUST DIDN'T LAST AND YOU DIDN'T HAVE MONEY TO GET MORE.: NEVER TRUE

## 2025-08-04 ASSESSMENT — PATIENT HEALTH QUESTIONNAIRE - PHQ9
SUM OF ALL RESPONSES TO PHQ QUESTIONS 1-9: 6
2. FEELING DOWN, DEPRESSED OR HOPELESS: NEARLY EVERY DAY
1. LITTLE INTEREST OR PLEASURE IN DOING THINGS: NEARLY EVERY DAY
SUM OF ALL RESPONSES TO PHQ QUESTIONS 1-9: 6

## 2025-08-05 LAB
25(OH)D3 SERPL-MCNC: 23 NG/ML (ref 30–100)
ALBUMIN SERPL-MCNC: 3.8 G/DL (ref 3.5–5)
ALBUMIN/GLOB SERPL: 1.1 (ref 1.1–2.2)
ALP SERPL-CCNC: 104 U/L (ref 45–117)
ALT SERPL-CCNC: 23 U/L (ref 12–78)
ANION GAP SERPL CALC-SCNC: 5 MMOL/L (ref 2–12)
APPEARANCE UR: CLEAR
AST SERPL-CCNC: 13 U/L (ref 15–37)
BACTERIA URNS QL MICRO: NEGATIVE /HPF
BASOPHILS # BLD: 0.03 K/UL (ref 0–0.1)
BASOPHILS NFR BLD: 0.5 % (ref 0–1)
BILIRUB SERPL-MCNC: 0.3 MG/DL (ref 0.2–1)
BILIRUB UR QL: NEGATIVE
BUN SERPL-MCNC: 17 MG/DL (ref 6–20)
BUN/CREAT SERPL: 20 (ref 12–20)
CALCIUM SERPL-MCNC: 8.6 MG/DL (ref 8.5–10.1)
CHLORIDE SERPL-SCNC: 110 MMOL/L (ref 97–108)
CO2 SERPL-SCNC: 25 MMOL/L (ref 21–32)
COLOR UR: NORMAL
CREAT SERPL-MCNC: 0.87 MG/DL (ref 0.7–1.3)
DIFFERENTIAL METHOD BLD: NORMAL
EOSINOPHIL # BLD: 0.11 K/UL (ref 0–0.4)
EOSINOPHIL NFR BLD: 1.7 % (ref 0–7)
EPITH CASTS URNS QL MICRO: NORMAL /LPF
ERYTHROCYTE [DISTWIDTH] IN BLOOD BY AUTOMATED COUNT: 13 % (ref 11.5–14.5)
ERYTHROCYTE [SEDIMENTATION RATE] IN BLOOD: 12 MM/HR (ref 0–20)
EST. AVERAGE GLUCOSE BLD GHB EST-MCNC: 111 MG/DL
FOLATE SERPL-MCNC: 20.9 NG/ML (ref 5–21)
GLOBULIN SER CALC-MCNC: 3.5 G/DL (ref 2–4)
GLUCOSE SERPL-MCNC: 96 MG/DL (ref 65–100)
GLUCOSE UR STRIP.AUTO-MCNC: NEGATIVE MG/DL
HBA1C MFR BLD: 5.5 % (ref 4–5.6)
HCT VFR BLD AUTO: 42 % (ref 36.6–50.3)
HGB BLD-MCNC: 13.2 G/DL (ref 12.1–17)
HGB UR QL STRIP: NEGATIVE
HYALINE CASTS URNS QL MICRO: NORMAL /LPF (ref 0–5)
IMM GRANULOCYTES # BLD AUTO: 0.01 K/UL (ref 0–0.04)
IMM GRANULOCYTES NFR BLD AUTO: 0.2 % (ref 0–0.5)
KETONES UR QL STRIP.AUTO: NEGATIVE MG/DL
LEUKOCYTE ESTERASE UR QL STRIP.AUTO: NEGATIVE
LYMPHOCYTES # BLD: 1.82 K/UL (ref 0.8–3.5)
LYMPHOCYTES NFR BLD: 27.8 % (ref 12–49)
MCH RBC QN AUTO: 29.7 PG (ref 26–34)
MCHC RBC AUTO-ENTMCNC: 31.4 G/DL (ref 30–36.5)
MCV RBC AUTO: 94.6 FL (ref 80–99)
MONOCYTES # BLD: 0.41 K/UL (ref 0–1)
MONOCYTES NFR BLD: 6.3 % (ref 5–13)
NEUTS SEG # BLD: 4.16 K/UL (ref 1.8–8)
NEUTS SEG NFR BLD: 63.5 % (ref 32–75)
NITRITE UR QL STRIP.AUTO: NEGATIVE
NRBC # BLD: 0 K/UL (ref 0–0.01)
NRBC BLD-RTO: 0 PER 100 WBC
PH UR STRIP: 5.5 (ref 5–8)
PLATELET # BLD AUTO: 278 K/UL (ref 150–400)
PMV BLD AUTO: 10.4 FL (ref 8.9–12.9)
POTASSIUM SERPL-SCNC: 3.9 MMOL/L (ref 3.5–5.1)
PROT SERPL-MCNC: 7.3 G/DL (ref 6.4–8.2)
PROT UR STRIP-MCNC: NEGATIVE MG/DL
RBC # BLD AUTO: 4.44 M/UL (ref 4.1–5.7)
RBC #/AREA URNS HPF: NORMAL /HPF (ref 0–5)
SODIUM SERPL-SCNC: 140 MMOL/L (ref 136–145)
SP GR UR REFRACTOMETRY: 1.02 (ref 1–1.03)
SPECIMEN HOLD: NORMAL
TSH SERPL DL<=0.05 MIU/L-ACNC: 1.14 UIU/ML (ref 0.36–3.74)
UROBILINOGEN UR QL STRIP.AUTO: 0.2 EU/DL (ref 0.2–1)
VIT B12 SERPL-MCNC: 421 PG/ML (ref 193–986)
WBC # BLD AUTO: 6.5 K/UL (ref 4.1–11.1)
WBC URNS QL MICRO: NORMAL /HPF (ref 0–4)

## 2025-08-19 ENCOUNTER — OFFICE VISIT (OUTPATIENT)
Age: 55
End: 2025-08-19

## 2025-08-19 DIAGNOSIS — Z71.89 COUNSELING AND COORDINATION OF CARE: Primary | ICD-10-CM

## 2025-09-04 ENCOUNTER — OFFICE VISIT (OUTPATIENT)
Age: 55
End: 2025-09-04

## 2025-09-04 DIAGNOSIS — Z71.89 COUNSELING AND COORDINATION OF CARE: Primary | ICD-10-CM
